# Patient Record
Sex: FEMALE | Race: WHITE | NOT HISPANIC OR LATINO | Employment: PART TIME | URBAN - METROPOLITAN AREA
[De-identification: names, ages, dates, MRNs, and addresses within clinical notes are randomized per-mention and may not be internally consistent; named-entity substitution may affect disease eponyms.]

---

## 2022-09-22 RX ORDER — DEXTROAMPHETAMINE SACCHARATE, AMPHETAMINE ASPARTATE, DEXTROAMPHETAMINE SULFATE AND AMPHETAMINE SULFATE 5; 5; 5; 5 MG/1; MG/1; MG/1; MG/1
20 TABLET ORAL
COMMUNITY
End: 2022-09-23 | Stop reason: SDUPTHER

## 2022-09-23 DIAGNOSIS — F90.2 ATTENTION DEFICIT HYPERACTIVITY DISORDER, COMBINED TYPE: Primary | ICD-10-CM

## 2022-09-23 DIAGNOSIS — F43.10 POSTTRAUMATIC STRESS DISORDER: ICD-10-CM

## 2022-09-23 DIAGNOSIS — F43.22 ADJUSTMENT DISORDER WITH ANXIETY: ICD-10-CM

## 2022-09-23 RX ORDER — DEXTROAMPHETAMINE SACCHARATE, AMPHETAMINE ASPARTATE, DEXTROAMPHETAMINE SULFATE AND AMPHETAMINE SULFATE 5; 5; 5; 5 MG/1; MG/1; MG/1; MG/1
TABLET ORAL
Qty: 60 TABLET | Refills: 0 | Status: SHIPPED | OUTPATIENT
Start: 2022-09-23 | End: 2022-10-10

## 2022-09-23 RX ORDER — DEXTROAMPHETAMINE SACCHARATE, AMPHETAMINE ASPARTATE, DEXTROAMPHETAMINE SULFATE AND AMPHETAMINE SULFATE 2.5; 2.5; 2.5; 2.5 MG/1; MG/1; MG/1; MG/1
TABLET ORAL
Qty: 60 TABLET | Refills: 0 | Status: SHIPPED | OUTPATIENT
Start: 2022-09-23 | End: 2022-10-10

## 2022-12-06 DIAGNOSIS — F90.2 ATTENTION DEFICIT HYPERACTIVITY DISORDER, COMBINED TYPE: ICD-10-CM

## 2022-12-06 RX ORDER — DEXTROAMPHETAMINE SACCHARATE, AMPHETAMINE ASPARTATE, DEXTROAMPHETAMINE SULFATE AND AMPHETAMINE SULFATE 7.5; 7.5; 7.5; 7.5 MG/1; MG/1; MG/1; MG/1
TABLET ORAL
Qty: 60 TABLET | Refills: 0 | Status: CANCELLED | OUTPATIENT
Start: 2022-12-06

## 2022-12-06 NOTE — TELEPHONE ENCOUNTER
Medication Refill Request     Name of Medication Adderall   Dose/Frequency 30mg 1 xdaily   Quantity 30  Verified pharmacy   [x]  Verified ordering Provider   [x]  Does patient have enough for the next 3 days? Yes [] No [x]  Does patient have a follow-up appointment scheduled?  Yes [x] No []   If so when is appointment: 01/19/23

## 2022-12-07 ENCOUNTER — TELEPHONE (OUTPATIENT)
Dept: PSYCHIATRY | Facility: CLINIC | Age: 47
End: 2022-12-07

## 2022-12-07 DIAGNOSIS — F90.2 ATTENTION DEFICIT HYPERACTIVITY DISORDER, COMBINED TYPE: Primary | ICD-10-CM

## 2022-12-07 RX ORDER — DEXTROAMPHETAMINE SACCHARATE, AMPHETAMINE ASPARTATE, DEXTROAMPHETAMINE SULFATE AND AMPHETAMINE SULFATE 3.75; 3.75; 3.75; 3.75 MG/1; MG/1; MG/1; MG/1
TABLET ORAL
Qty: 120 TABLET | Refills: 0 | Status: SHIPPED | OUTPATIENT
Start: 2022-12-07 | End: 2023-04-05

## 2022-12-07 NOTE — TELEPHONE ENCOUNTER
Adderall XR 15mg PA request received and processed  Waiting for INS determination  12/8-Approval through 01/10/2023  Spoke to and made aware  Scanned to chart

## 2022-12-08 ENCOUNTER — DOCUMENTATION (OUTPATIENT)
Dept: PSYCHIATRY | Facility: CLINIC | Age: 47
End: 2022-12-08

## 2022-12-19 DIAGNOSIS — F90.2 ATTENTION DEFICIT HYPERACTIVITY DISORDER, COMBINED TYPE: ICD-10-CM

## 2022-12-19 RX ORDER — DEXTROAMPHETAMINE SACCHARATE, AMPHETAMINE ASPARTATE, DEXTROAMPHETAMINE SULFATE AND AMPHETAMINE SULFATE 7.5; 7.5; 7.5; 7.5 MG/1; MG/1; MG/1; MG/1
TABLET ORAL
Qty: 60 TABLET | Refills: 0 | Status: SHIPPED | OUTPATIENT
Start: 2022-12-19

## 2022-12-19 NOTE — TELEPHONE ENCOUNTER
Medication Refill Request     Name of Medication Adderall   Dose/Frequency 30 Mg 1 po bid   Quantity 30  Verified pharmacy   [x]  Verified ordering Provider   [x]  Does patient have enough for the next 3 days? Yes [x] No []  Does patient have a follow-up appointment scheduled? Yes [] No [x]   If so when is appointment: 01/19/23    OR     Medication Refill Request     Name of Medication Adderall   Dose/Frequency 15 Mg 2po bid   Quantity 60  Verified pharmacy   [x]  Verified ordering Provider   [x]  Does patient have enough for the next 3 days? Yes [] No [x]  Does patient have a follow-up appointment scheduled?  Yes [] No []   If so when is appointment: 01/19/23

## 2022-12-20 ENCOUNTER — TELEPHONE (OUTPATIENT)
Dept: PSYCHIATRY | Facility: CLINIC | Age: 47
End: 2022-12-20

## 2022-12-20 ENCOUNTER — DOCUMENTATION (OUTPATIENT)
Dept: PSYCHIATRY | Facility: CLINIC | Age: 47
End: 2022-12-20

## 2022-12-20 NOTE — TELEPHONE ENCOUNTER
Adderall 30mg PA request rec;d and processed  Waiting for INS determination  12/20/2022- Approval received through 12/20/2023  Left message to make aware  Will scan to chart

## 2022-12-22 DIAGNOSIS — F90.2 ATTENTION DEFICIT HYPERACTIVITY DISORDER, COMBINED TYPE: ICD-10-CM

## 2022-12-22 RX ORDER — DEXTROAMPHETAMINE SACCHARATE, AMPHETAMINE ASPARTATE, DEXTROAMPHETAMINE SULFATE AND AMPHETAMINE SULFATE 7.5; 7.5; 7.5; 7.5 MG/1; MG/1; MG/1; MG/1
TABLET ORAL
Qty: 60 TABLET | Refills: 0 | Status: SHIPPED | OUTPATIENT
Start: 2022-12-22

## 2022-12-22 NOTE — TELEPHONE ENCOUNTER
Patient walked in and explained that [facility] had dispensed Adderral on 12/8 but only for a half month supply (16 days)  Patient requests an Rx Refill for the regular amount originally prescribed, but send order to [address] [place] [address]  Patient states that she will walk over to [facility] and provide insurance information in preparation  Patient requests call back when complete  Medication Refill Request     Name of Medication Adderall  Dose/Frequency 30mg 2x a day  Quantity 60 tabs  Verified pharmacy   [x]  Verified ordering Provider   [x]  Does patient have enough for the next 3 days? Yes [] No [x]  Does patient have a follow-up appointment scheduled?  Yes [] No []   If so when is appointment: 1/19/23 @ 11:30am

## 2022-12-28 ENCOUNTER — TELEPHONE (OUTPATIENT)
Dept: PSYCHIATRY | Facility: CLINIC | Age: 47
End: 2022-12-28

## 2022-12-28 NOTE — TELEPHONE ENCOUNTER
Called client to inform her that med refill she requested was sent to [facility]  Pharmacy number provided to client

## 2023-01-19 ENCOUNTER — TELEMEDICINE (OUTPATIENT)
Dept: PSYCHIATRY | Facility: CLINIC | Age: 48
End: 2023-01-19

## 2023-01-19 DIAGNOSIS — F90.2 ATTENTION DEFICIT HYPERACTIVITY DISORDER, COMBINED TYPE: ICD-10-CM

## 2023-01-19 RX ORDER — DEXTROAMPHETAMINE SACCHARATE, AMPHETAMINE ASPARTATE, DEXTROAMPHETAMINE SULFATE AND AMPHETAMINE SULFATE 7.5; 7.5; 7.5; 7.5 MG/1; MG/1; MG/1; MG/1
TABLET ORAL
Qty: 60 TABLET | Refills: 0 | Status: SHIPPED | OUTPATIENT
Start: 2023-01-19

## 2023-01-19 NOTE — PSYCH
Virtual Regular Visit    Verification of patient location:in car    Patient is located in the following state in which I hold an active license [place]      Assessment/Plan:       Diagnoses and all orders for this visit:    Attention deficit hyperactivity disorder, combined type  -     amphetamine-dextroamphetamine (ADDERALL) 30 MG tablet; Take 1 PO BID  -     amphetamine-dextroamphetamine (ADDERALL, 30MG,) 30 MG tablet; Take 1 PO BID  -     amphetamine-dextroamphetamine (ADDERALL, 30MG,) 30 MG tablet; Take 1 PO BID          Goals addressed in session:   Good Health  Counseling provided:      Treatment Recommendations- Risks Benefits       Immediate Medical/Psychiatric/Psychotherapy Treatments and Any Precautions:     Risks, Benefits And Possible Side Effects Of Medications:  Risks, benefits, and possible side effects of medications explained to patient and patient verbalizes understanding    Controlled Medication Discussion: The patient has been filling controlled prescriptions on time as prescribed to [facility]      Reason for visit is No chief complaint on file  Medication Management     Encounter provider RYAN Meyer    Provider [place] [address] [place] [address]  [phone]      Recent Visits  No visits were found meeting these conditions  Showing recent visits within past 7 days and meeting all other requirements  Today's Visits  Date Type Provider Dept   01/19/23 Telemedicine [facility] today's visits and meeting all other requirements  Future Appointments  No visits were found meeting these conditions  Showing future appointments within next 150 days and meeting all other requirements       The patient was identified by name and date of birth   Mikeluis Zaratelindsay was informed that this is a telemedicine visit and [facility] the [facility]  She agrees to proceed     My office door was closed  No one else was in the room  She acknowledged consent and understanding of privacy and security of the video platform  The patient has agreed to participate and understands they can discontinue the visit at any time  Patient is aware this is a billable service  Subjective    Jessica Quintanilla is a 52 y o  female    here today for a med check  This was via [facility]  normal appetite      HPI  Mood OK  "I am going through the changes "  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  Focus good   No problems with medication  Appetite Sleep good  Health OK  Denies SI/HI     No past medical history on file  No past surgical history on file  Current Outpatient Medications   Medication Sig Dispense Refill   • amphetamine-dextroamphetamine (ADDERALL) 30 MG tablet Take 1 PO BID 60 tablet 0   • amphetamine-dextroamphetamine (ADDERALL, 30MG,) 30 MG tablet Take 1 PO BID 60 tablet 0   • amphetamine-dextroamphetamine (ADDERALL, 30MG,) 30 MG tablet Take 1 PO BID 60 tablet 0   • amphetamine-dextroamphetamine (ADDERALL, 15MG,) 15 MG tablet Take 2 PO  tablet 0     No current facility-administered medications for this visit  Not on File    Social History     Substance and Sexual Activity   Drug Use Not on file       No family history on file          Objective    Mental status:  Appearance calm and cooperative , adequate hygiene and grooming and good eye contact    Mood mood appropriate   Affect affect appropriate    Speech a normal rate and fluent   Thought Processes normal thought processes   Hallucinations no hallucinations present    Thought Content no delusions   Abnormal Thoughts no suicidal thoughts  and no homicidal thoughts    Orientation  oriented to person and place and time   Remote Memory short term memory intact and long term memory intact   Attention Span concentration intact   Intellect Appears to be of Average Intelligence   Insight Insight intact   Judgement judgment was intact   Muscle Strength Muscle strength and tone were normal   Language no difficulty naming common objects   Fund of Knowledge displays adequate knowledge of current events   Pain none   Pain Scale 0       Video Exam    There were no vitals filed for this visit      I spent 15 minutes directly with the patient during this visit    Patient Instructions   Continue Current Tx  Session lasted 5 minutes after she left to document, Tx Plan and meds  Report Problems  Return 3 months       Visit Time    Visit Start Time: 0886  Visit Stop Time: 3349  Total Visit Duration: 21 min

## 2023-01-19 NOTE — BH TREATMENT PLAN
TREATMENT PLAN (Medication Management Only)        [facility]    Name and Date of Birth:  Remigio Bailey 52 y o  1975  Date of Treatment Plan: January 19, 2023  Diagnosis/Diagnoses:    1  Attention deficit hyperactivity disorder, combined type      Strengths/Personal Resources for Self-Care: supportive family, supportive friends, taking medications as prescribed, ability to adapt to life changes, ability to communicate needs, ability to communicate well, ability to listen, ability to reason, ability to understand psychiatric illness  Area/Areas of need (in own words): ADHD symptoms  1  Long Term Goal: maintain ADHD symptoms  Target Date:6 months - 7/19/2023  Person/Persons responsible for completion of goal: Anais Schneider  2  Short Term Objective (s) - How will we reach this goal?:   A  Provider new recommended medication/dosage changes and/or continue medication(s): continue current medications as prescribed Adderall   B  N/A   C  N/A  Target Date:6 months - 7/19/2023  Person/Persons Responsible for Completion of Goal: Anais Schneider  Progress Towards Goals: stable  Treatment Modality: medication management every 3 months  Review due 180 days from date of this plan: 6 months - 7/19/2023  Expected length of service: maintenance  My Physician/PA/NP and I have developed this plan together and I agree to work on the goals and objectives  I understand the treatment goals that were developed for my treatment

## 2023-01-19 NOTE — PATIENT INSTRUCTIONS
Continue Current Tx  Session lasted 5 minutes after she left to document, Tx Plan and meds  Report Problems  Return 3 months

## 2023-04-05 ENCOUNTER — TELEMEDICINE (OUTPATIENT)
Dept: PSYCHIATRY | Facility: CLINIC | Age: 48
End: 2023-04-05

## 2023-04-05 DIAGNOSIS — F90.2 ATTENTION DEFICIT HYPERACTIVITY DISORDER, COMBINED TYPE: Primary | ICD-10-CM

## 2023-04-05 RX ORDER — DEXTROAMPHETAMINE SACCHARATE, AMPHETAMINE ASPARTATE, DEXTROAMPHETAMINE SULFATE AND AMPHETAMINE SULFATE 7.5; 7.5; 7.5; 7.5 MG/1; MG/1; MG/1; MG/1
TABLET ORAL
Qty: 60 TABLET | Refills: 0 | Status: SHIPPED | OUTPATIENT
Start: 2023-04-05

## 2023-04-05 NOTE — PSYCH
Virtual Regular Visit    Verification of patient location:at home    Patient is located in the following state in which I hold an active license [place]      Assessment/Plan:       Diagnoses and all orders for this visit:    Attention deficit hyperactivity disorder, combined type  -     amphetamine-dextroamphetamine (ADDERALL, 30MG,) 30 MG tablet; Take 1 PO BID  -     amphetamine-dextroamphetamine (ADDERALL, 30MG,) 30 MG tablet; Take 1 PO BID  -     amphetamine-dextroamphetamine (ADDERALL) 30 MG tablet; Take 1 PO BID          Goals addressed in session:   Good Health  Stresssss Management   Counseling provided:      Treatment Recommendations- Risks Benefits       Immediate Medical/Psychiatric/Psychotherapy Treatments and Any Precautions:     Risks, Benefits And Possible Side Effects Of Medications:  Risks, benefits, and possible side effects of medications explained to patient and patient verbalizes understanding    Controlled Medication Discussion: No records found for controlled prescriptions according to [facility]      Reason for visit is No chief complaint on file  Medication management     Encounter provider RYAN Ramires    Provider [place] [address] [place] [address]  [phone]      Recent Visits  No visits were found meeting these conditions  Showing recent visits within past 7 days and meeting all other requirements  Today's Visits  Date Type Provider Dept   04/05/23 Telemedicine [facility] today's visits and meeting all other requirements  Future Appointments  No visits were found meeting these conditions  Showing future appointments within next 150 days and meeting all other requirements       The patient was identified by name and date of birth   Bethanie Gomez was informed that this is a telemedicine visit and that the "visit is being conducted throughthe [facility] platform  She agrees to proceed     My office door was closed  No one else was in the room  She acknowledged consent and understanding of privacy and security of the video platform  The patient has agreed to participate and understands they can discontinue the visit at any time  Patient is aware this is a billable service  Sang Peng is a 52 y o  female    Here today for a med check  This was via [address] [facility]    normal appetite      HPI  Mood \"OK\"  Going through \"menopause\"  Focus \"fine\"  \"The medication slows things down for me\"  No problems with medication   Appetite Sleep good  Health OK  Denies SI/HI    No past medical history on file  No past surgical history on file  Current Outpatient Medications   Medication Sig Dispense Refill   • amphetamine-dextroamphetamine (ADDERALL) 30 MG tablet Take 1 PO BID 60 tablet 0   • amphetamine-dextroamphetamine (ADDERALL, 30MG,) 30 MG tablet Take 1 PO BID 60 tablet 0   • amphetamine-dextroamphetamine (ADDERALL, 30MG,) 30 MG tablet Take 1 PO BID 60 tablet 0     No current facility-administered medications for this visit  Not on File    Social History     Substance and Sexual Activity   Drug Use Not on file       No family history on file          Objective    Mental status:  Appearance calm and cooperative , adequate hygiene and grooming and good eye contact    Mood mood appropriate   Affect affect appropriate    Speech a normal rate and fluent   Thought Processes normal thought processes   Hallucinations no hallucinations present    Thought Content no delusions   Abnormal Thoughts no suicidal thoughts  and no homicidal thoughts    Orientation  oriented to person and place and time   Remote Memory short term memory intact and long term memory intact   Attention Span concentration intact   Intellect Appears to be of Average Intelligence   Insight Insight intact   Judgement judgment was intact " Muscle Strength Muscle strength and tone were normal and Normal gait    Language no difficulty naming common objects   Fund of Knowledge displays adequate knowledge of current events   Pain none   Pain Scale 0       Video Exam    There were no vitals filed for this visit      I spent 15 minutes directly with the patient during this visit    Patient Instructions   Continue Current Tx  Report Problems  Return 3 months       Visit Time    Visit Start Time: 9860  Visit Stop Time: 833836 min  Total Visit Duration:

## 2023-04-05 NOTE — BH TREATMENT PLAN
TREATMENT PLAN (Medication Management Only)        [facility]    Name and Date of Birth:  Jason Mills 52 y o  1975  Date of Treatment Plan: April 5, 2023  Diagnosis/Diagnoses:    1  Attention deficit hyperactivity disorder, combined type      Strengths/Personal Resources for Self-Care: supportive family, supportive friends, taking medications as prescribed  Area/Areas of need (in own words): ADHD symptoms  1  Long Term Goal: maintain control of ADHD symptoms  Target Date:6 months - 10/5/2023  Person/Persons responsible for completion of goal: Ayanna Frankel  2  Short Term Objective (s) - How will we reach this goal?:   A  Provider new recommended medication/dosage changes and/or continue medication(s): continue current medications as prescribed Adderall   B  N/A   C  N/A  Target Date:6 months - 10/5/2023  Person/Persons Responsible for Completion of Goal: Ayanna Frankel  Progress Towards Goals: stable  Treatment Modality: medication management every 3 months  Review due 180 days from date of this plan: 6 months - 10/5/2023  Expected length of service: maintenance  My Physician/PA/NP and I have developed this plan together and I agree to work on the goals and objectives  I understand the treatment goals that were developed for my treatment

## 2023-06-13 ENCOUNTER — DOCUMENTATION (OUTPATIENT)
Dept: PSYCHIATRY | Facility: CLINIC | Age: 48
End: 2023-06-13

## 2023-06-13 NOTE — PSYCH
[facility]    Patient Name oCy Patel     Date of Birth: 52 y o  1975      MRN: [id number]    Admission Date: several years ago    Date of Transfer: June 13, 2023    Admission Diagnosis:     Adjustment Disorder with anxiety  PTSD  ADHD, Combined type    Current Diagnosis:     No diagnosis found  Reason for Admission: BODØ presented for treatment due to depression, ADHD and PTSD symptoms  Primary complaints included ANXIETY SYMPTOMS: unremarkable and ADHD SYMPTOMS: unremarkable  Progress in Treatment: BODØ was seen for Medication Management  During the course of treatment she      Episodes of Higher Level of Care: No    Transfer request Initiated by: Psychiatrist: Nurse Practitioner Maame Whitney Therapist: None    Reason for Transfer Request: clinician leaving practice    Does this individual need a clinician with specialized training/expertise?: No    Is this client working with any other [facility] Providers/Therapists? Psychiatrist: None Therapist: None    Other pertinent issues: None    Are there any specific individuals who would be a “best fit” or who have already agreed to accept this transfer request?      Psychiatrist: None   Therapist: None  Rationale: Not Applicable    Attempts to maintain the current therapeutic relationship: Not Applicable    Transfer request routed to Clinical Coordinator for input and/or approval      Comments from other involved providers and/or clinical coordinator: None    ELIZABETH Browning 46    Patient Name Coy Patel     Date of Birth: 52 y o  1975      MRN: [id number]    Admission Date: several years ago    Date of Transfer: June 13, 2023    Admission Diagnosis:     Adjustment Disorder with anxiety  PTSD  ADHD, Combined type    Current Diagnosis:     No diagnosis found      Reason for Admission: Payton Busby presented for treatment due to ADHD, anxiety and PTSD symptoms  Primary complaints included DEPRESSIVE SYMPTOMS: unremarkable - euthymic mood  Progress in Treatment: Payton Busby was seen for Medication Management  During the course of treatment she      Episodes of Higher Level of Care: No    Transfer request Initiated by: Psychiatrist: Nurse Practitioner Alize Champagne Therapist: None    Reason for Transfer Request: clinician leaving practice    Does this individual need a clinician with specialized training/expertise?: No    Is this client working with any other [facility] Providers/Therapists?  Psychiatrist: None Therapist: None    Other pertinent issues: None    Are there any specific individuals who would be a “best fit” or who have already agreed to accept this transfer request?      Psychiatrist: None   Therapist: None  Rationale: Not Applicable    Attempts to maintain the current therapeutic relationship: Not Applicable    Transfer request routed to Clinical Coordinator for input and/or approval      Comments from other involved providers and/or clinical coordinator: None    BASHIR BairesNP06/13/23

## 2023-06-16 ENCOUNTER — TELEPHONE (OUTPATIENT)
Dept: PSYCHIATRY | Facility: CLINIC | Age: 48
End: 2023-06-16

## 2023-06-16 NOTE — TELEPHONE ENCOUNTER
Contacted client about cancelling 7/5/2023 appt  with RYAN Summers due to him retiring  We are in the process of hiring new providers within our practice  Once we have their schedules, we will contact you to reschedule your appt  Please call [phone] to request refills as needed

## 2023-07-24 DIAGNOSIS — F90.2 ATTENTION DEFICIT HYPERACTIVITY DISORDER, COMBINED TYPE: ICD-10-CM

## 2023-07-24 RX ORDER — DEXTROAMPHETAMINE SACCHARATE, AMPHETAMINE ASPARTATE, DEXTROAMPHETAMINE SULFATE AND AMPHETAMINE SULFATE 7.5; 7.5; 7.5; 7.5 MG/1; MG/1; MG/1; MG/1
TABLET ORAL
Qty: 60 TABLET | Refills: 0 | Status: SHIPPED | OUTPATIENT
Start: 2023-07-24

## 2023-07-24 NOTE — TELEPHONE ENCOUNTER
Walked in and informed about the process with Garcia's transition plan. Will need a refill on her medication though, confirmed pharmacy and medication that is needed. Information given to prescription department and informed about wait time.

## 2023-09-05 ENCOUNTER — TELEPHONE (OUTPATIENT)
Dept: PSYCHIATRY | Facility: CLINIC | Age: 48
End: 2023-09-05

## 2023-09-05 DIAGNOSIS — F90.2 ATTENTION DEFICIT HYPERACTIVITY DISORDER, COMBINED TYPE: ICD-10-CM

## 2023-09-05 RX ORDER — DEXTROAMPHETAMINE SACCHARATE, AMPHETAMINE ASPARTATE, DEXTROAMPHETAMINE SULFATE AND AMPHETAMINE SULFATE 7.5; 7.5; 7.5; 7.5 MG/1; MG/1; MG/1; MG/1
TABLET ORAL
Qty: 60 TABLET | Refills: 0 | Status: SHIPPED | OUTPATIENT
Start: 2023-09-05

## 2023-09-06 ENCOUNTER — TELEPHONE (OUTPATIENT)
Dept: PSYCHIATRY | Facility: CLINIC | Age: 48
End: 2023-09-06

## 2023-09-19 ENCOUNTER — TELEPHONE (OUTPATIENT)
Dept: PSYCHIATRY | Facility: CLINIC | Age: 48
End: 2023-09-19

## 2023-09-19 NOTE — TELEPHONE ENCOUNTER
Outreach call placed about MA not showing active for tomorrow appointments. Requested a call to see if client would like to reschedule the appointment or pay the self pay rate for the appointment.   Call back [phone]

## 2023-10-03 ENCOUNTER — OFFICE VISIT (OUTPATIENT)
Dept: PSYCHIATRY | Facility: CLINIC | Age: 48
End: 2023-10-03
Payer: COMMERCIAL

## 2023-10-03 DIAGNOSIS — F90.2 ATTENTION DEFICIT HYPERACTIVITY DISORDER, COMBINED TYPE: ICD-10-CM

## 2023-10-03 DIAGNOSIS — F41.1 GAD (GENERALIZED ANXIETY DISORDER): Primary | ICD-10-CM

## 2023-10-03 PROCEDURE — 90792 PSYCH DIAG EVAL W/MED SRVCS: CPT | Performed by: STUDENT IN AN ORGANIZED HEALTH CARE EDUCATION/TRAINING PROGRAM

## 2023-10-03 RX ORDER — DEXTROAMPHETAMINE SACCHARATE, AMPHETAMINE ASPARTATE, DEXTROAMPHETAMINE SULFATE AND AMPHETAMINE SULFATE 7.5; 7.5; 7.5; 7.5 MG/1; MG/1; MG/1; MG/1
TABLET ORAL
Qty: 60 TABLET | Refills: 0 | Status: SHIPPED | OUTPATIENT
Start: 2023-10-03

## 2023-10-03 NOTE — PSYCH
[address] [address]    Name and Date of Birth:  Tania Yang 52 y.o. 1975 MRN: [id number]    Date of Visit: October 3, 2023    Reason for visit: Full psychiatric intake assessment for medication management     HPI     Tania Yang is a 52 y.o. female with a past psychiatric history significant for ADHD, depression and anxiety who presents to the [address] [place] [address] outpatient clinic for intake assessment. Fay Vivas presents as a new patient for this physician after being transferred from her previous provider Angélica Scales who had recently retired. Today, patient denied SI, HI, AVH, milo, delusions but did endorse some feelings of depression and anxiety as quantified by the screening test which indicated moderate depression and moderate to severe anxiety. Patient stated that her mood symptoms began as a child when she had to constantly change schools at least 9 times after her parents were . She stated that she had to "grow up quickly" to deal with the tumultuous [place] of her childhood. She ended up struggling throughout class due to symptoms of ADHD which included inattention and hyperactivity. She stated that she hated school and almost did not graduate high school since she stopped at 11th grade and then later enrolled in a program to finally complete high school and earn a GED. She did have a few instances of suicidal ideation in the form of death wishes most recently about 3 years ago when she was in the process of getting  from her previous partner. She also states that currently she is unemployed and is constantly trying to find a new job but feels almost as if life has been against her because none of them have seemed to pan out for her.   She states that she has 3 children, 2 of whom have moved out and have successful personal lives but she also has a 5year-old daughter at home whom she cares for. She states that her commitment to her family and her daughter is what keeps her going and states that she feels as if she has large emotional reserves to have been relatively stable throughout the tumultuous [place] of her life. With regards to her history she denies HI, AVH, delusions, milo. She states that her Adderall helps her inattention and hyperactivity however it paradoxically sometimes makes her sleepy. Regardless, she wishes to continue on the Adderall 30 mg twice daily. She states that she had recently within the last year or 2 started to suffer from post menopausal symptoms. She had broached the topic of treating the postmenopausal symptoms with her OB. In addition to this, she had previously started the conversation with her previous provider Sherwin Youssef to potentially initiate another medication to help her with her depression and anxiety. She states that her sleep has been tumultuous because her child tends to come into her bedroom and disrupt her sleep. Her appetite has been fair. Her goals of treatment include continuing ADHD medications and exploring new treatments for her depression and anxiety. Time was also taken to discuss a crisis plan including calling [phone] or heading to the nearest ED should she have any mental health decompensation or crisis whatsoever, to which she was amenable. She also voiced understanding and agreement to call 911 or go to the nearest ED should she have any physical emergency or decompensation whatsoever. Other than mental health difficulties, she did state that she has intermittent migraines for which she is being treated by her PCP.     Current Rating Scores:     Current PHQ-9   PHQ-2/9 Depression Screening    Little interest or pleasure in doing things: 0 - not at all  Feeling down, depressed, or hopeless: 3 - nearly every day  Trouble falling or staying asleep, or sleeping too much: 0 - not at all  Feeling tired or having little energy: 3 - nearly every day  Poor appetite or overeatin - more than half the days  Feeling bad about yourself - or that you are a failure or have let yourself or your family down: 1 - several days  Trouble concentrating on things, such as reading the newspaper or watching television: 2 - more than half the days  Moving or speaking so slowly that other people could have noticed. Or the opposite - being so fidgety or restless that you have been moving around a lot more than usual: 0 - not at all  Thoughts that you would be better off dead, or of hurting yourself in some way: 0 - not at all  PHQ-9 Score: 11   PHQ-9 Interpretation: Moderate depression        Current JERRI-7 is   JERRI-7 Flowsheet Screening    Flowsheet Row Most Recent Value   Over the last 2 weeks, how often have you been bothered by any of the following problems? Feeling nervous, anxious, or on edge 3   Not being able to stop or control worrying 3   Worrying too much about different things 3   Trouble relaxing 3   Being so restless that it is hard to sit still 2   Becoming easily annoyed or irritable 3   Feeling afraid as if something awful might happen 2   JERRI-7 Total Score 19      . Psychiatric Review Of Systems:    Sleep changes: decreased  Appetite changes: no  Weight changes: no  Energy/anergy: yes  Interest/pleasure/anhedonia: decreased  Somatic symptoms: no  Anxiety/panic: yes  Pinky: no  Guilty/hopeless: yes  Self injurious behavior/risky behavior: no  Suicidal ideation: no  Homicidal ideation: no  Auditory hallucinations: no  Visual hallucinations: no  Other hallucinations: no  Delusional thinking: no  Eating disorder history: no, unknown  Obsessive/compulsive symptoms: unknown    Review Of Systems:   At this time:  Constitutional negative   ENT negative   Cardiovascular negative   Respiratory cough   Gastrointestinal negative   Genitourinary negative   Musculoskeletal negative   Integumentary negative   Neurological headache   Endocrine negative   Other Symptoms none, all other systems are negative       Family Psychiatric History:     No family history on file. Maternal grandfather may have committed suicide. Past Psychiatric History:     Inpatient psychiatric admissions: Denies  Prior outpatient psychiatric linkage: [facility]  Past/current psychotherapy: None reported  History of suicidal attempts/gestures: At least 3 incidences of death wishes but denies harming self  History of violence/aggressive behaviors: None reported  Psychotropic medication trials: Wellbutrin, Effexor. Neither helped the patient for depression or anxiety  Substance abuse inpatient/outpatient rehabilitation: None reported    Substance Abuse History:     No past legal actions or arrests secondary to substance intoxication. The patient denies prior DWIs/DUIs. No history of outpatient/inpatient rehabilitation programs. Leida Pelletier does not exhibit objective evidence of substance withdrawal during today's examination nor does Leida Pelletier appear under the influence of any psychoactive substance. Denies any drugs or alcohol other than some cigarettes each day. Social History:    Developmental: Denies a history of milestone/developmental delay. Denies a history of in-utero exposure to toxins/illicit substances. Needed some special education due to likely ADHD growing up  Education: high school diploma/GED  Marital history:   Living arrangement, social support: children, has 2 other grown children. Lives with 5year-old. States that she is in danger of losing her current residence due to financial difficulties  Occupational History: unemployed  Access to firearms: Denies direct access to weapons/firearms. Hector Lantigua has no history of arrests or violence with a deadly weapon. Traumatic History:     Abuse:verbal  Other Traumatic Events: None reported    Past Medical History:    No past medical history on file. No past surgical history on file.   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 She states that this sometimes causes her some feelings of dysphagia. She denies any worsening of this and voices understanding and agreement to call 911 or go to the ED should this worsen. She also voices understanding and agreement to closely follow up with a PCP or specialist regarding these issues. History Review: The following portions of the patient's history were reviewed and updated as appropriate: allergies, current medications, past family history, past medical history, past social history, past surgical history and problem list.    OBJECTIVE:    Vital signs in last 24 hours: There were no vitals filed for this visit.     Mental Status Evaluation:    Appearance age appropriate, casually dressed, looks stated age, overweight   Behavior cooperative, mildly anxious   Speech normal rate, normal volume, normal pitch   Mood dysphoric   Affect constricted   Thought Processes organized, goal directed   Associations intact associations   Thought Content no overt delusions   Perceptual Disturbances: no auditory hallucinations, no visual hallucinations   Abnormal Thoughts  Risk Potential Suicidal ideation - None  Homicidal ideation - None  Potential for aggression - No   Orientation oriented to person, place, time/date and situation   Memory recent and remote memory grossly intact   Consciousness alert and awake   Attention Span Concentration Span attention span and concentration are age appropriate   Intellect appears to be of average intelligence   Insight intact   Judgement intact   Muscle Strength and  Gait normal muscle strength and normal muscle tone, normal gait and normal balance   Motor Activity no abnormal movements   Language no difficulty naming common objects, no difficulty repeating a phrase   Fund of Knowledge adequate knowledge of current events  adequate fund of knowledge regarding past history  adequate fund of knowledge regarding vocabulary    Pain none   Pain Scale 0       Laboratory Results: I have personally reviewed all pertinent laboratory/tests results    Recent Labs (last 2 months):   No visits with results within 2 Month(s) from this visit. Latest known visit with results is:   No results found for any previous visit. Suicide/Homicide Risk Assessment:    Risk of Harm to Self:  • The following ratings are based on assessment at the time of the interview  • Historical Risk Factors include: History of suicidal ideation  • Protective Factors: no current suicidal ideation, compliant with mental health treatment, connection to own children, having a desire to be alive    Risk of Harm to Others:  • The following ratings are based on assessment at the time of the interview  • Historical Risk Factors include: none. • Protective Factors: no current homicidal ideation    The following interventions are recommended: no intervention changes needed. Although patient's acute lethality risk is LOW, long-term/chronic lethality risk is mildly elevated given __. However, at the current moment, Perry Perez is future-oriented, forward-thinking, and demonstrates ability to act in a self-preserving manner as evidenced by volitionally presenting to the clinic today, seeking treatment. Additionally, Perry Perez __ suggesting a will and desire to live. At this juncture, inpatient hospitalization is not currently warranted. To mitigate future risk, patient should adhere to treatment recommendations, avoid alcohol/illicit substance use, utilize community-based resources and familiar support, and prioritize mental health treatment. DSM-V Diagnoses:     1.)  JERRI  2.)  MDD, moderate, recurrent  3.)  ADHD combined type    Assessment/Plan:     Plan is to continue 30 mg Adderall twice daily.   Patient was counseled to take her second dose of Adderall earlier in the day to help her sleep better at night. Will meet again at next appointment to discuss possible SSRI initiation to help with feelings of depression and anxiety. Today's Plan/Medical Decision Making:    Psychopharmacologically, I spoke at length with Donn Saunders about the bio-psycho-social approach to treatment and avenues for intervention. I stressed the importance of making better dietary choices, expanding exercise regimen, and reestablishing a sense of purpose and connectivity in life. Treatment Recommendations/Precautions:        Medication management every 1 months  Aware of 24 hour and weekend coverage for urgent situations accessed by calling [facility] main practice number    Patient voiced understanding and agreement to call 911 or head to the nearest emergency room should they experience any physical or mental health decompensation whatsoever. Medications Risks/Benefits:      Risks, Benefits And Possible Side Effects Of Medications:    Risks, benefits, and possible side effects of medications explained to Donn Saunders and she verbalizes understanding and agreement for treatment. Controlled Medication Discussion:     Donn Saunders has been filling controlled prescriptions on time as prescribed according to [place] Prescription Drug Monitoring Program    Treatment Plan:    Completed and signed during the session: We will perform in next encounter due to lack of time today      Visit Time    Visit Start Time: 9:10 AM  Visit Stop Time: 9:50 AM  Total Visit Duration: 40 minutes     The total visit duration detailed above includes: patient engagement, medication management, psychotherapy/counseling, discussion regarding treatment goals, documentation, review of past medical records, and coordination of care. Note Share Disclaimer:      This note was not shared with the patient due to reasonable likelihood of causing patient harm    Steven Baum DO  10/03/23

## 2023-10-31 ENCOUNTER — OFFICE VISIT (OUTPATIENT)
Dept: PSYCHIATRY | Facility: CLINIC | Age: 48
End: 2023-10-31
Payer: COMMERCIAL

## 2023-10-31 DIAGNOSIS — F22 PARANOIA (HCC): Primary | ICD-10-CM

## 2023-10-31 PROCEDURE — 99214 OFFICE O/P EST MOD 30 MIN: CPT | Performed by: STUDENT IN AN ORGANIZED HEALTH CARE EDUCATION/TRAINING PROGRAM

## 2023-10-31 RX ORDER — ARIPIPRAZOLE 5 MG/1
5 TABLET ORAL DAILY
Qty: 8 TABLET | Refills: 0 | Status: SHIPPED | OUTPATIENT
Start: 2023-10-31 | End: 2023-11-05 | Stop reason: SDUPTHER

## 2023-10-31 NOTE — PSYCH
MEDICATION MANAGEMENT NOTE         [facility]      Name and Date of Birth:  Krzysztof Shannon 52 y.o. 1975 MRN: [id number]    Date of Visit: October 31, 2023    Reason for Visit: Follow-up visit for medication management       SUBJECTIVE:    Krzysztof Shannon is a 52 y.o. female with past psychiatric history significant for ADHD, PTSD, adjustment disorder with anxiety who was personally seen and evaluated today at the [address] [place] [address] outpatient clinic for follow-up and medication management. Sorin Ram denies SI, HI, AVH, milo today or since our last appointment but does endorse feelings of depression and anxiety along with paranoid delusions. Patient states that she is getting evicted within the next week or so and this has caused her significant stress and anxiety. She feels supported by her family such as her son who reportedly showed up for a "intervention" at her home. With regards to patient's paranoid thoughts, these had been voiced and an Beatriz manner in our past conversation when patient described herself as "cursed", meaning that patient had been feeling like no matter what she did, someone or something would interfere and derail her plans or her efforts. Today, patient again alluded to this by stating that there was what she called a "middleman" between her and her laptop, causing her laptop to constantly malfunction and subsequently caused her to lose jobs as a result. Another example of these paranoid thoughts patient had stated was that she feels as if people leaving garbage outside of her car to harass her. 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 Multiple other examples can be provided about this strain of paranoid delusion in both this conversation and her previous conversation however significantly, it seems to be affecting her ability to apply for  and support or seek employment and prevent herself from getting evicted. Patient states now that she has "a couple jobs" lined up however admits that she has lost multiple ones in the past and she is not hopeful or fully confident that these will be effective in keeping her off the street. Patient did not mention any details regarding the care or plan of care with her daughter until her daughter was specifically mentioned and patient could only state that it "worried her" that she had no plan whatsoever about maintaining her daughter's connection with school or any true plan of care, simply stating that she had "no one."  In summation, it appears as if patient has a diagnosis of paranoid personality disorder and this has the potential to impact her care of her child and herself. After discussion of risks, benefits, potential side effects, alternatives, patient was open to trialing Abilify 5 mg/day to help with some of her feelings of paranoia, with the painstaking clarification to not imply that patient was being universally paranoid but that this was meant to help take the edge off of some of her fears in her daily life. Subsequent plan would be starting an antidepressant which could help with patient's feelings of depression, anxiety, PTSD and also be augmented potentially with Abilify should patient tolerate the Abilify. Adderall regimen would remain the same and patient indicated that she is compliant with her Adderall and has adequate transportation to receive refills for her medication. She denied any acute specifically mental health complaints or concerns at this time. Current Rating Scores:     None completed today.     Review Of Systems:      Constitutional negative   ENT negative   Cardiovascular negative   Respiratory negative   Gastrointestinal negative   Genitourinary negative   Musculoskeletal negative   Integumentary negative   Neurological negative   Endocrine negative   Other Symptoms none, all other systems are negative       Past Psychiatric History: (unchanged information from previous note copied and italicized) - Information that is bolded has been updated. Inpatient psychiatric admissions: Denies  Prior outpatient psychiatric linkage: [facility]  Past/current psychotherapy: None reported  History of suicidal attempts/gestures: At least 3 incidences of death wishes but denies harming self  History of violence/aggressive behaviors: None reported  Psychotropic medication trials: Wellbutrin, Effexor. Neither helped the patient for depression or anxiety  Substance abuse inpatient/outpatient rehabilitation: None reported    Substance Abuse History: (unchanged information from previous note copied and italicized) - Information that is bolded has been updated. No past legal actions or arrests secondary to substance intoxication. The patient denies prior DWIs/DUIs. No history of outpatient/inpatient rehabilitation programs. [place] does not exhibit objective evidence of substance withdrawal during today's examination nor does [place] appear under the influence of any psychoactive substance. Denies any drugs or alcohol other than some cigarettes each day. Social History: (unchanged information from previous note copied and italicized) - Information that is bolded has been updated. Developmental: Denies a history of milestone/developmental delay. Denies a history of in-utero exposure to toxins/illicit substances. Needed some special education due to likely ADHD growing up  Education: high school diploma/GED  Marital history:   Living arrangement, social support: children, has 2 other grown children. Lives with 5year-old.   States that she is in danger of losing her current residence due to financial difficulties  Occupational History: unemployed  Access to firearms: Denies direct access to weapons/firearms. Suzie Payment has no history of arrests or violence with a deadly weapon. Traumatic History: (unchanged information from previous note copied and italicized) - Information that is bolded has been updated. Abuse:verbal  Other Traumatic Events: None reported    Past Medical History:    No past medical history on file. No past surgical history on file. No Known Allergies    Substance Abuse History:    Social History     Substance and Sexual Activity   Alcohol Use Not on file     Social History     Substance and Sexual Activity   Drug Use Not on file       Social History:    Social History     Socioeconomic History    Marital status: Legally      Spouse name: Not on file    Number of children: Not on file    Years of education: Not on file    Highest education level: Not on file   Occupational History    Not on file   Tobacco Use    Smoking status: Some Days     Types: Cigarettes    Smokeless tobacco: Never   Substance and Sexual Activity    Alcohol use: Not on file    Drug use: Not on file    Sexual activity: Not on file   Other Topics Concern    Not on file   Social History Narrative    Not on file     Social Determinants of Health     Financial Resource Strain: Not on file   Food Insecurity: Not on file   Transportation Needs: Not on file   Physical Activity: Not on file   Stress: Not on file   Social Connections: Not on file   Intimate Partner Violence: Not on file   Housing Stability: Not on file       Family Psychiatric History:     No family history on file. History Review:  The following portions of the patient's history were reviewed and updated as appropriate: allergies, current medications, past family history, past medical history, past social history, past surgical history, and problem list.         OBJECTIVE: Vital signs in last 24 hours: There were no vitals filed for this visit. Mental Status Evaluation:    Appearance age appropriate, casually dressed, overweight   Behavior cooperative, appears anxious   Speech normal rate, normal volume, normal pitch   Mood anxious   Affect constricted   Thought Processes organized, goal directed   Associations intact associations   Thought Content paranoid delusions   Perceptual Disturbances: no auditory hallucinations, no visual hallucinations   Abnormal Thoughts  Risk Potential Suicidal ideation - None  Homicidal ideation - None  Potential for aggression - No   Orientation oriented to person, place, time/date, and situation   Memory recent and remote memory grossly intact   Consciousness alert and awake   Attention Span Concentration Span attention span and concentration are age appropriate   Intellect appears to be of average intelligence   Insight fair   Judgement fair   Muscle Strength and  Gait normal muscle strength and normal muscle tone, normal gait and normal balance   Motor activity no abnormal movements   Language no difficulty naming common objects, no difficulty repeating a phrase   Fund of Knowledge adequate knowledge of current events  adequate fund of knowledge regarding past history  adequate fund of knowledge regarding vocabulary    Pain none   Pain Scale Did not ask patient to formally rate       Laboratory Results: I have personally reviewed all pertinent laboratory/tests results    Recent Labs (last 4 months):   No visits with results within 4 Month(s) from this visit. Latest known visit with results is:   No results found for any previous visit.        Suicide/Homicide Risk Assessment:    Risk of Harm to Self:  The following ratings are based on assessment at the time of the interview  Historical Risk Factors include: chronic psychiatric problems  Protective Factors: no current suicidal ideation, access to mental health treatment, being a parent, compliant with medications, compliant with mental health treatment, responsibilities and duties to others    Risk of Harm to Others: The following ratings are based on assessment at the time of the interview  Historical Risk Factors include: none. Protective Factors: no current homicidal ideation, being a parent, compliant with medications, compliant with mental health treatment, responsibilities and duties to others    The following interventions are recommended: contracts for safety at present - agrees to go to ED if feeling unsafe, contracts for safety at present - agrees to call Crisis Intervention Service if feeling unsafe      Lethality Statement:    Based on today's assessment and clinical criteria, Jose Alfredo Sahni contracts for safety and is not an imminent risk of harm to self or others. Outpatient level of care is deemed appropriate at this current time. Amberly Nick understands that if they can no longer contract for safety, they need to call the office or report to their nearest Emergency Room for immediate evaluation. They voiced understanding and agreement to call 911 or head to the nearest ED should they have any physical or mental decompensation whatsoever. Assessment/Plan:     1.)  ADHD  2.)  PTSD  3.)  JERRI  4.)  Paranoid personality disorder    Will start Abilify 5 mg daily to address paranoia with the intent of initiating antidepressant next week for the purposes of depression and anxiety and PTSD. Patient voiced understanding and agreement to crisis plan.   Adderall will remain at 30 mg twice daily          Medication management every 1 week  Aware of 24 hour and weekend coverage for urgent situations accessed by calling [facility] main practice number    Medications Risks/Benefits      Risks, Benefits And Possible Side Effects Of Medications:    Risks, benefits, and possible side effects of medications explained to Amberly Nick and she verbalizes understanding and agreement for treatment. Controlled Medication Discussion:     Sujey Davila has been filling controlled prescriptions on time as prescribed according to [address] [facility] [address] [facility]    Psychotherapy Provided:     Individual psychotherapy provided: Importance of medication and treatment compliance reviewed with Sujey Davila. Educated on importance of medication and treatment compliance. Importance of follow up with family physician for medical issues reviewed with Sujey Davila. Treatment Plan:    Completed and signed during the session: We will perform in next encounter due to lack of time today      Visit Time    Visit Start Time: 9:30 AM  Visit Stop Time: 10:00 AM  Total Visit Duration:  30 minutes     The total visit duration detailed above includes: patient engagement, medication management, psychotherapy/counseling, discussion regarding treatment goals, documentation, review of past medical records, and coordination of care. Note Share Disclaimer:      This note was not shared with the patient due to reasonable likelihood of causing patient harm      Jennifer Cho DO  Psychiatry  10/31/23

## 2023-11-05 RX ORDER — ARIPIPRAZOLE 5 MG/1
5 TABLET ORAL DAILY
Qty: 14 TABLET | Refills: 0 | Status: SHIPPED | OUTPATIENT
Start: 2023-11-05 | End: 2023-11-19

## 2023-11-07 ENCOUNTER — OFFICE VISIT (OUTPATIENT)
Dept: PSYCHIATRY | Facility: CLINIC | Age: 48
End: 2023-11-07
Payer: COMMERCIAL

## 2023-11-07 DIAGNOSIS — R04.2 HEMOPTYSIS: ICD-10-CM

## 2023-11-07 DIAGNOSIS — F90.2 ATTENTION DEFICIT HYPERACTIVITY DISORDER, COMBINED TYPE: ICD-10-CM

## 2023-11-07 DIAGNOSIS — F41.1 GAD (GENERALIZED ANXIETY DISORDER): Primary | ICD-10-CM

## 2023-11-07 PROCEDURE — 99214 OFFICE O/P EST MOD 30 MIN: CPT | Performed by: STUDENT IN AN ORGANIZED HEALTH CARE EDUCATION/TRAINING PROGRAM

## 2023-11-07 RX ORDER — DEXTROAMPHETAMINE SACCHARATE, AMPHETAMINE ASPARTATE, DEXTROAMPHETAMINE SULFATE AND AMPHETAMINE SULFATE 7.5; 7.5; 7.5; 7.5 MG/1; MG/1; MG/1; MG/1
TABLET ORAL
Qty: 60 TABLET | Refills: 0 | Status: SHIPPED | OUTPATIENT
Start: 2023-11-07

## 2023-11-07 RX ORDER — FLUOXETINE 10 MG/1
10 TABLET, FILM COATED ORAL DAILY
Qty: 30 TABLET | Refills: 0 | Status: SHIPPED | OUTPATIENT
Start: 2023-11-07 | End: 2023-12-07

## 2023-11-07 RX ORDER — DEXTROAMPHETAMINE SACCHARATE, AMPHETAMINE ASPARTATE, DEXTROAMPHETAMINE SULFATE AND AMPHETAMINE SULFATE 7.5; 7.5; 7.5; 7.5 MG/1; MG/1; MG/1; MG/1
TABLET ORAL
Qty: 60 TABLET | Refills: 0 | Status: SHIPPED | OUTPATIENT
Start: 2023-12-05

## 2023-11-08 NOTE — PSYCH
MEDICATION MANAGEMENT NOTE        [facility]      Name and Date of Birth:  Taylor Pineda 52 y.o. 1975 MRN: [id number]    Date of Visit: November 7, 2023    Reason for Visit: Follow-up visit for medication management       SUBJECTIVE:    Taylor Pineda is a 52 y.o. female with past psychiatric history significant for ADHD, PTSD, adjustment disorder with anxiety who was personally seen and evaluated today at the [address] [place] [address] outpatient clinic for follow-up and medication management. Tonya Heard denies SI, HI, AVH, milo today or since our last appointment. However, she endorses significant feelings of depression and anxiety due to being evicted within the next 2 to 3 days. She states that her previous ideas about where she would be able to live did not pan out and she states that she may have to go live with one of her family members while her daughter stays with her ex-. Patient stated that she believes that her search for living accommodations was sabotaged by her current apartment owners who had hacked into her computer and her "profile" and are actively monitoring and sabotaging her efforts in obtaining housing. She states that "I know computer engineering and this is not crazy or paranoid" when describing her adamant belief that somehow her apartment owners were evicting her have hacked into [employer] itself. This is in keeping with the patient's prior paranoid delusions described in previous notes. In addition to this, patient's paranoia manifests an extreme somatic complaints. She states that she constantly "coughed up blood" for years however the primary care physician that she had tried to get in contact with had "screwed up" or otherwise not followed up with her or refused to follow-up with her in a vaguely described way.   She states that she took the Abilify once at night and the next day felt a variety of different adverse side effects including but not limited to headache, nausea, vomiting, anger, depression amongst others. It became apparent that the patient's paranoid delusions are significantly interfering with her daily life and preventing her from obtaining housing and affecting her relationship with her family. Despite this, patient declined to initiate another medication to potentially help with this feeling of paranoia. Paranoid delusions were not directly confronted in a blunt manner to avoid harming the doctor-patient relationship. However, patient declined to initiate any antipsychotics that could potentially help her with mood stabilization as an added benefit. She instead voiced her strong preference for starting an antidepressant to deal with her depression and anxiety and after discussion of risks, benefits, potential side effects, alternatives, patient stated that she would like to try Prozac since it had worked for her years ago when she had been on it. Patient was initiated on 10 mg of Prozac per day with the intent of titrating it upwards as tolerated. Time was also spent again screening for history of milo which patient adamantly declined. Patient states that she continues to want to try and get better for the sake of her daughter who waited outside in the waiting room during the appointment. She voiced understanding and agreement at our crisis plan again today. Current Rating Scores:     None completed today.     Review Of Systems:      Constitutional negative   ENT negative other than chronic self reported hemoptysis unchanged since previous weeks   Cardiovascular negative   Respiratory negative   Gastrointestinal negative   Genitourinary negative   Musculoskeletal negative   Integumentary negative   Neurological negative   Endocrine negative   Other Symptoms none, all other systems are negative       Past Psychiatric History: (unchanged information from previous note copied and italicized) - Information that is bolded has been updated. Inpatient psychiatric admissions: Denies  Prior outpatient psychiatric linkage: [facility]  Past/current psychotherapy: None reported  History of suicidal attempts/gestures: At least 3 incidences of death wishes but denies harming self  History of violence/aggressive behaviors: None reported  Psychotropic medication trials: Wellbutrin, Effexor. Neither helped the patient for depression or anxiety. Prozac may have helped  Substance abuse inpatient/outpatient rehabilitation: None reported    Substance Abuse History: (unchanged information from previous note copied and italicized) - Information that is bolded has been updated. No past legal actions or arrests secondary to substance intoxication. The patient denies prior DWIs/DUIs. No history of outpatient/inpatient rehabilitation programs. Fay Vivas does not exhibit objective evidence of substance withdrawal during today's examination nor does Fay Vivas appear under the influence of any psychoactive substance. Denies any drugs or alcohol other than some cigarettes each day. Social History: (unchanged information from previous note copied and italicized) - Information that is bolded has been updated. Developmental: Denies a history of milestone/developmental delay. Denies a history of in-utero exposure to toxins/illicit substances. Needed some special education due to likely ADHD growing up  Education: high school diploma/GED  Marital history:   Living arrangement, social support: children, has 2 other grown children. Lives with 5year-old. States that she is in danger of losing her current residence due to financial difficulties  Occupational History: unemployed  Access to firearms: Denies direct access to weapons/firearms. Tania Yang has no history of arrests or violence with a deadly weapon.     Traumatic History: (unchanged information from previous note copied and italicized) - Information that is bolded has been updated. Abuse:verbal  Other Traumatic Events: None reported    Past Medical History:    No past medical history on file. No past surgical history on file. No Known Allergies    Substance Abuse History:    Social History     Substance and Sexual Activity   Alcohol Use Not on file     Social History     Substance and Sexual Activity   Drug Use Not on file       Social History:    Social History     Socioeconomic History    Marital status: Legally      Spouse name: Not on file    Number of children: Not on file    Years of education: Not on file    Highest education level: Not on file   Occupational History    Not on file   Tobacco Use    Smoking status: Some Days     Types: Cigarettes    Smokeless tobacco: Never   Substance and Sexual Activity    Alcohol use: Not on file    Drug use: Not on file    Sexual activity: Not on file   Other Topics Concern    Not on file   Social History Narrative    Not on file     Social Determinants of Health     Financial Resource Strain: Not on file   Food Insecurity: Not on file   Transportation Needs: Not on file   Physical Activity: Not on file   Stress: Not on file   Social Connections: Not on file   Intimate Partner Violence: Not on file   Housing Stability: Not on file       Family Psychiatric History:     No family history on file. History Review: The following portions of the patient's history were reviewed and updated as appropriate: allergies, current medications, past family history, past medical history, past social history, past surgical history, and problem list.         OBJECTIVE:     Vital signs in last 24 hours: There were no vitals filed for this visit.     Mental Status Evaluation:    Appearance age appropriate, casually dressed, overweight   Behavior cooperative, appears anxious   Speech normal rate, normal volume, normal pitch   Mood anxious   Affect constricted   Thought Processes organized, goal directed   Associations intact associations   Thought Content paranoid delusions   Perceptual Disturbances: no auditory hallucinations, no visual hallucinations   Abnormal Thoughts  Risk Potential Suicidal ideation - None  Homicidal ideation - None  Potential for aggression - No   Orientation oriented to person, place, time/date, and situation   Memory recent and remote memory grossly intact   Consciousness alert and awake   Attention Span Concentration Span attention span and concentration are age appropriate   Intellect appears to be of average intelligence   Insight fair   Judgement fair   Muscle Strength and  Gait normal muscle strength and normal muscle tone, normal gait and normal balance   Motor activity no abnormal movements   Language no difficulty naming common objects, no difficulty repeating a phrase   Fund of Knowledge adequate knowledge of current events  adequate fund of knowledge regarding past history  adequate fund of knowledge regarding vocabulary    Pain none   Pain Scale Did not ask patient to formally rate       Laboratory Results: I have personally reviewed all pertinent laboratory/tests results    Recent Labs (last 4 months):   No visits with results within 4 Month(s) from this visit. Latest known visit with results is:   No results found for any previous visit. Suicide/Homicide Risk Assessment:    Risk of Harm to Self:  The following ratings are based on assessment at the time of the interview  Historical Risk Factors include: chronic psychiatric problems  Protective Factors: no current suicidal ideation, access to mental health treatment, being a parent, compliant with medications, compliant with mental health treatment, responsibilities and duties to others    Risk of Harm to Others: The following ratings are based on assessment at the time of the interview  Historical Risk Factors include: none.   Protective Factors: no current homicidal ideation, being a parent, compliant with medications, compliant with mental health treatment, responsibilities and duties to others    The following interventions are recommended: contracts for safety at present - agrees to go to ED if feeling unsafe, contracts for safety at present - agrees to call Crisis Intervention Service if feeling unsafe      Lethality Statement:    Based on today's assessment and clinical criteria, Hector Lantigua contracts for safety and is not an imminent risk of harm to self or others. Outpatient level of care is deemed appropriate at this current time. Leida Pelletier understands that if they can no longer contract for safety, they need to call the office or report to their nearest Emergency Room for immediate evaluation. They voiced understanding and agreement to call 911 or head to the nearest ED should they have any physical or mental decompensation whatsoever. Assessment/Plan:     1.)  ADHD  2.)  PTSD  3.)  JERRI  4.)  Paranoid personality disorder      Abilify discontinued at patient request.  Will initiate Prozac 10 mg/day for depression and anxiety. Rest of regimen remains the same with Adderall 30 mg twice daily. Primary care referral made for patient's somatic and medical complaints and patient voiced understanding and agreement crisis plan. Patient stated that she had an adequate supply of her adderall until next week. Medication management every 1 week  Aware of 24 hour and weekend coverage for urgent situations accessed by calling [facility] main practice number    Medications Risks/Benefits      Risks, Benefits And Possible Side Effects Of Medications:    Risks, benefits, and possible side effects of medications explained to Leida Pelletier and she verbalizes understanding and agreement for treatment.     Controlled Medication Discussion:     Leida Pelletier has been filling controlled prescriptions on time as prescribed according to [address] [facility]    Psychotherapy Provided:     Individual psychotherapy provided: Importance of medication and treatment compliance reviewed with Augustine June. Educated on importance of medication and treatment compliance. Importance of follow up with family physician for medical issues reviewed with Augustine June. Treatment Plan:    Completed and signed during the session: We will perform in next encounter due to lack of time today      Visit Time    Visit Start Time: 9:30 AM  Visit Stop Time: 10:00 AM  Total Visit Duration:  30 minutes     The total visit duration detailed above includes: patient engagement, medication management, psychotherapy/counseling, discussion regarding treatment goals, documentation, review of past medical records, and coordination of care. Note Share Disclaimer:      This note was not shared with the patient due to reasonable likelihood of causing patient harm      Claudean Brewer DO  Psychiatry  11/07/23

## 2023-11-14 ENCOUNTER — TELEPHONE (OUTPATIENT)
Dept: PSYCHIATRY | Facility: CLINIC | Age: 48
End: 2023-11-14

## 2023-11-21 ENCOUNTER — OFFICE VISIT (OUTPATIENT)
Dept: PSYCHIATRY | Facility: CLINIC | Age: 48
End: 2023-11-21
Payer: COMMERCIAL

## 2023-11-21 DIAGNOSIS — F41.1 GAD (GENERALIZED ANXIETY DISORDER): Primary | ICD-10-CM

## 2023-11-21 PROCEDURE — 99212 OFFICE O/P EST SF 10 MIN: CPT | Performed by: STUDENT IN AN ORGANIZED HEALTH CARE EDUCATION/TRAINING PROGRAM

## 2023-11-21 RX ORDER — FLUOXETINE HYDROCHLORIDE 20 MG/1
20 CAPSULE ORAL DAILY
Qty: 30 CAPSULE | Refills: 2 | Status: SHIPPED | OUTPATIENT
Start: 2023-11-21 | End: 2023-11-21 | Stop reason: SDUPTHER

## 2023-11-21 RX ORDER — FLUOXETINE HYDROCHLORIDE 20 MG/1
20 CAPSULE ORAL DAILY
Qty: 30 CAPSULE | Refills: 2 | Status: SHIPPED | OUTPATIENT
Start: 2023-11-21 | End: 2024-02-19

## 2023-12-15 NOTE — PSYCH
MEDICATION MANAGEMENT NOTE        [facility]      Name and Date of Birth:  Paop Green 50 y.o. 1975 MRN: [id number]    Date of Visit: 11/21/2023    Reason for Visit: Follow-up visit for medication management       SUBJECTIVE:    Papo Green is a 50 y.o. female with past psychiatric history significant for ADHD, PTSD, adjustment disorder with anxiety who was personally seen and evaluated today at the [facility] outpatient clinic for follow-up and medication management. [place] denies SI, HI, AVH, milo today or since our last appointment. Patient states that she continues to have financial difficulties and that her daughter is now living at times with her ex-. However, she states that she is hopeful about procuring a job that works for her. She endorses improved mood and decreased feelings of depression and anxiety on Prozac 10 mg. After discussion of risks, benefits, the side effects, alternatives, will increase Prozac to 20 mg/day. Patient denies acute mental health complaints or concerns at this time and voices understanding and agreement to our crisis plan. Current Rating Scores:     None completed today. Review Of Systems:      Constitutional negative   ENT negative other than chronic self reported hemoptysis unchanged since previous weeks   Cardiovascular negative   Respiratory negative   Gastrointestinal negative   Genitourinary negative   Musculoskeletal negative   Integumentary negative   Neurological negative   Endocrine negative   Other Symptoms none, all other systems are negative       Past Psychiatric History: (unchanged information from previous note copied and italicized) - Information that is bolded has been updated.      Inpatient psychiatric admissions: Denies  Prior outpatient psychiatric linkage: [facility]  Past/current psychotherapy: None reported  History of suicidal attempts/gestures: At least 3 incidences of death wishes but denies harming self  History of violence/aggressive behaviors: None reported  Psychotropic medication trials: Wellbutrin, Effexor. Neither helped the patient for depression or anxiety. Prozac may have helped  Substance abuse inpatient/outpatient rehabilitation: None reported    Substance Abuse History: (unchanged information from previous note copied and italicized) - Information that is bolded has been updated. No past legal actions or arrests secondary to substance intoxication. The patient denies prior DWIs/DUIs. No history of outpatient/inpatient rehabilitation programs. Brijesh Garcia does not exhibit objective evidence of substance withdrawal during today's examination nor does Brijesh Garcia appear under the influence of any psychoactive substance. Denies any drugs or alcohol other than some cigarettes each day. Social History: (unchanged information from previous note copied and italicized) - Information that is bolded has been updated. Developmental: Denies a history of milestone/developmental delay. Denies a history of in-utero exposure to toxins/illicit substances. Needed some special education due to likely ADHD growing up  Education: high school diploma/GED  Marital history:   Living arrangement, social support: children, has 2 other grown children. Lives with 5year-old. States that she is in danger of losing her current residence due to financial difficulties  Occupational History: unemployed  Access to firearms: Denies direct access to weapons/firearms. Becky Stanford has no history of arrests or violence with a deadly weapon. Traumatic History: (unchanged information from previous note copied and italicized) - Information that is bolded has been updated. Abuse:verbal  Other Traumatic Events: None reported    Past Medical History:    No past medical history on file. No past surgical history on file.   No Known Allergies    Substance Abuse History:    Social History     Substance and Sexual Activity   Alcohol Use Not on file     Social History     Substance and Sexual Activity   Drug Use Not on file       Social History:    Social History     Socioeconomic History    Marital status: Legally      Spouse name: Not on file    Number of children: Not on file    Years of education: Not on file    Highest education level: Not on file   Occupational History    Not on file   Tobacco Use    Smoking status: Some Days     Types: Cigarettes    Smokeless tobacco: Never   Substance and Sexual Activity    Alcohol use: Not on file    Drug use: Not on file    Sexual activity: Not on file   Other Topics Concern    Not on file   Social History Narrative    Not on file     Social Determinants of Health     Financial Resource Strain: Not on file   Food Insecurity: Not on file   Transportation Needs: Not on file   Physical Activity: Not on file   Stress: Stress Concern Present (3/19/2021)    Received from Álvaro     Feeling of Stress : To some extent   Social Connections: Not on file   Intimate Partner Violence: Not on file   Housing Stability: Not on file       Family Psychiatric History:     No family history on file. History Review: The following portions of the patient's history were reviewed and updated as appropriate: allergies, current medications, past family history, past medical history, past social history, past surgical history, and problem list.         OBJECTIVE:     Vital signs in last 24 hours: There were no vitals filed for this visit.     Mental Status Evaluation:    Appearance age appropriate, casually dressed, overweight   Behavior cooperative, appears anxious   Speech normal rate, normal volume, normal pitch   Mood anxious   Affect constricted   Thought Processes organized, goal directed   Associations intact associations   Thought Content paranoid delusions   Perceptual Disturbances: no auditory hallucinations, no visual hallucinations   Abnormal Thoughts  Risk Potential Suicidal ideation - None  Homicidal ideation - None  Potential for aggression - No   Orientation oriented to person, place, time/date, and situation   Memory recent and remote memory grossly intact   Consciousness alert and awake   Attention Span Concentration Span attention span and concentration are age appropriate   Intellect appears to be of average intelligence   Insight fair   Judgement fair   Muscle Strength and  Gait normal muscle strength and normal muscle tone, normal gait and normal balance   Motor activity no abnormal movements   Language no difficulty naming common objects, no difficulty repeating a phrase   Fund of Knowledge adequate knowledge of current events  adequate fund of knowledge regarding past history  adequate fund of knowledge regarding vocabulary    Pain none   Pain Scale Did not ask patient to formally rate       Laboratory Results: I have personally reviewed all pertinent laboratory/tests results    Recent Labs (last 4 months):   No visits with results within 4 Month(s) from this visit. Latest known visit with results is:   No results found for any previous visit. Suicide/Homicide Risk Assessment:    Risk of Harm to Self:  The following ratings are based on assessment at the time of the interview  Historical Risk Factors include: chronic psychiatric problems  Protective Factors: no current suicidal ideation, access to mental health treatment, being a parent, compliant with medications, compliant with mental health treatment, responsibilities and duties to others    Risk of Harm to Others: The following ratings are based on assessment at the time of the interview  Historical Risk Factors include: none.   Protective Factors: no current homicidal ideation, being a parent, compliant with medications, compliant with mental health treatment, responsibilities and duties to others    The following interventions are recommended: contracts for safety at present - agrees to go to ED if feeling unsafe, contracts for safety at present - agrees to call Crisis Intervention Service if feeling unsafe      Lethality Statement:    Based on today's assessment and clinical criteria, Aaron Benítez contracts for safety and is not an imminent risk of harm to self or others. Outpatient level of care is deemed appropriate at this current time. Italo Curtis understands that if they can no longer contract for safety, they need to call the office or report to their nearest Emergency Room for immediate evaluation. They voiced understanding and agreement to call 911 or head to the nearest ED should they have any physical or mental decompensation whatsoever. Assessment/Plan:     1.)  ADHD  2.)  PTSD  3.)  JERRI  4.)  Paranoid personality disorder      Prozac increased to 20 mg/day. Rest of regimen remains the same. Medication management every 1 week  Aware of 24 hour and weekend coverage for urgent situations accessed by calling [facility] main practice number    Medications Risks/Benefits      Risks, Benefits And Possible Side Effects Of Medications:    Risks, benefits, and possible side effects of medications explained to Italo Curtis and she verbalizes understanding and agreement for treatment. Controlled Medication Discussion:     Italo Curtis has been filling controlled prescriptions on time as prescribed according to [address] [facility]    Psychotherapy Provided:     Individual psychotherapy provided: Importance of medication and treatment compliance reviewed with Italo Curtis. Educated on importance of medication and treatment compliance. Importance of follow up with family physician for medical issues reviewed with Italo Curtis.      Treatment Plan:    Completed and signed during the session: We will perform in next encounter due to lack of time today      Visit Time    Visit Start Time: 11:00 AM  Visit Stop Time: 11:15 AM  Total Visit Duration:  15 minutes     The total visit duration detailed above includes: patient engagement, medication management, psychotherapy/counseling, discussion regarding treatment goals, documentation, review of past medical records, and coordination of care. Note Share Disclaimer:      This note was not shared with the patient due to reasonable likelihood of causing patient harm      Joseluis Meza DO  Psychiatry

## 2024-01-16 DIAGNOSIS — F90.2 ATTENTION DEFICIT HYPERACTIVITY DISORDER, COMBINED TYPE: ICD-10-CM

## 2024-01-16 RX ORDER — DEXTROAMPHETAMINE SACCHARATE, AMPHETAMINE ASPARTATE, DEXTROAMPHETAMINE SULFATE AND AMPHETAMINE SULFATE 7.5; 7.5; 7.5; 7.5 MG/1; MG/1; MG/1; MG/1
TABLET ORAL
Qty: 60 TABLET | Refills: 0 | Status: SHIPPED | OUTPATIENT
Start: 2024-01-16

## 2024-01-22 ENCOUNTER — OFFICE VISIT (OUTPATIENT)
Dept: PSYCHIATRY | Facility: CLINIC | Age: 49
End: 2024-01-22
Payer: COMMERCIAL

## 2024-01-22 DIAGNOSIS — F60.0 PARANOID PERSONALITY (DISORDER) (HCC): Primary | ICD-10-CM

## 2024-01-22 PROCEDURE — 99214 OFFICE O/P EST MOD 30 MIN: CPT | Performed by: STUDENT IN AN ORGANIZED HEALTH CARE EDUCATION/TRAINING PROGRAM

## 2024-01-22 NOTE — PSYCH
"Client Name: Shani Gallegos       Client YOB: 1975  : 1975    Treatment Team:     Psychiatrist: Steven Baum DO    Healthcare Provider  Dionicio Daniel MD  [address] [facility] [address]        Plan Type: adolescent/adult (14 and over) Initial    My Personal Strengths are (in the client's own words):  \"***\"    The stressors and triggers that may put me at risk are:  {Stressors:69220}    Coping skills I can use to keep myself calm and safe:  {EFO Crisis Plan Coping Skills:52361}    Coping skills/supports I can use to maintain abstinence from substance use:   {EFO Crisis Plan Substance Copin::\"Not Applicable\"}    The people that provide me with help and support: (Include name, contact, and how they can help)    Support Persons #1:   *No emergency contact information on file.   *How they can help me? \"***\"    Support person #2: {EFO Crisis Plan Supportive Person:79674}   * Phone number: {EFO Support Person Phone:36473::\"in cell phone\"}   * How can they help me? \"***\"     Support person #3: {EFO Crisis Plan Supportive Person:42559}    * Phone number: {EFO Support Person Phone:95059::\"in cell phone\"}    * How can they help me? \"***\"    In the past, the following has helped me in times of crisis:    {AMB PSYCH/BH Things that help:50087}      If it is an emergency and you need immediate help, call     If there is a possibility of danger to yourself or others, call the following crisis hotline resources:     Adult Crisis Numbers  Suicide Prevention Hotline - Dial   [facility]: [phone]  [facility]: [phone]  [facility]: [phone]  [facility]: [phone]  [place]: [phone]  [facility]: [phone]  [facility]: [phone]  [place] [facility]: [phone] (daytime).       [phone] (after hours, weekends, holidays)     Child/Adolescent Crisis Numbers   [facility]: " [phone]   [facility]: [phone]   [place]: [phone]   [facility]: [phone]    Please note: Some [facility] do not have a separate number for Child/Adolescent specific crisis. If your county is not listed under Child/Adolescent, please call the adult number for your county     National Talk to Text Line   All Ages - [phone]    In the event your feelings become unmanageable, and you cannot reach your support system, you will call 911 immediately or go to the nearest hospital emergency room. If you have any physical or medical emergency or feel as if you are in physical/medical distress, call 911 immediately or go to the nearest hospital emergency room.

## 2024-01-22 NOTE — PSYCH
MEDICATION MANAGEMENT NOTE        [facility]      Name and Date of Birth:  Shani Gallegos 48 y.o. 1975 MRN: [id number]    Date of Visit: 1/22/2024    Reason for Visit: Follow-up visit for medication management       SUBJECTIVE:    Shani Gallegos is a 48 y.o. female with past psychiatric history significant for ADHD, PTSD, paranoid personality disorder y who was personally seen and evaluated today at the [facility] outpatient clinic for follow-up and medication management. Shani denies SI, HI, AVH, milo today or since our last appointment.  Patient states that she feels as if her life is progressing well and she has started working as a  again.  She continues to have interpersonal difficulties between herself, her ex- and her daughter.  However, she states that she feels more hopeful about the future.  After discussion of risks, benefits, and side effects, alternatives, patient concedes that though she has not been consistent with taking her medications, she would like to continue on current regimen as is without any changes.  Patient continues to express paranoid traits at her appointment, continues to believe that various actors or malicious forces are interfering with her use of technology and causing her phone to malfunction.  She remains engaged with family, work and personal hobbies.    Current Rating Scores:     None completed today.    Review Of Systems:      Constitutional negative   ENT negative other than chronic self reported hemoptysis unchanged since previous weeks   Cardiovascular negative   Respiratory negative   Gastrointestinal negative   Genitourinary negative   Musculoskeletal negative   Integumentary negative   Neurological negative   Endocrine negative   Other Symptoms none, all other systems are negative       Past Psychiatric History: (unchanged information from previous note copied and  italicized) - Information that is bolded has been updated.     Inpatient psychiatric admissions: Denies  Prior outpatient psychiatric linkage: [facility]  Past/current psychotherapy: None reported  History of suicidal attempts/gestures: At least 3 incidences of death wishes but denies harming self  History of violence/aggressive behaviors: None reported  Psychotropic medication trials: Wellbutrin, Effexor.  Neither helped the patient for depression or anxiety. Prozac may have helped  Substance abuse inpatient/outpatient rehabilitation: None reported    Substance Abuse History: (unchanged information from previous note copied and italicized) - Information that is bolded has been updated.      No past legal actions or arrests secondary to substance intoxication. The patient denies prior DWIs/DUIs. No history of outpatient/inpatient rehabilitation programs. Shani does not exhibit objective evidence of substance withdrawal during today's examination nor does Shani appear under the influence of any psychoactive substance.       Denies any drugs or alcohol other than some cigarettes each day.    Social History: (unchanged information from previous note copied and italicized) - Information that is bolded has been updated.     Developmental: Denies a history of milestone/developmental delay. Denies a history of in-utero exposure to toxins/illicit substances.  Needed some special education due to likely ADHD growing up  Education: high school diploma/GED  Marital history:   Living arrangement, social support: children, has 2 other grown children.  Lives with 9-year-old.  States that she is in danger of losing her current residence due to financial difficulties  Occupational History: unemployed  Access to firearms: Denies direct access to weapons/firearms. Shani Gallegos has no history of arrests or violence with a deadly weapon.    Traumatic History: (unchanged information from previous note copied and  italicized) - Information that is bolded has been updated.       Abuse:verbal  Other Traumatic Events: None reported    Past Medical History:    No past medical history on file.     No past surgical history on file.  No Known Allergies    Substance Abuse History:    Social History     Substance and Sexual Activity   Alcohol Use Not on file     Social History     Substance and Sexual Activity   Drug Use Not on file       Social History:    Social History     Socioeconomic History    Marital status: Legally      Spouse name: Not on file    Number of children: Not on file    Years of education: Not on file    Highest education level: Not on file   Occupational History    Not on file   Tobacco Use    Smoking status: Some Days     Types: Cigarettes    Smokeless tobacco: Never   Substance and Sexual Activity    Alcohol use: Not on file    Drug use: Not on file    Sexual activity: Not on file   Other Topics Concern    Not on file   Social History Narrative    Not on file     Social Determinants of Health     Financial Resource Strain: Not on file   Food Insecurity: Not on file   Transportation Needs: Not on file   Physical Activity: Not on file   Stress: Stress Concern Present (3/19/2021)    Received from [facility] - Occupational Stress Questionnaire     Feeling of Stress : To some extent   Social Connections: Not on file   Intimate Partner Violence: Not on file   Housing Stability: Not on file       Family Psychiatric History:     No family history on file.    History Review: The following portions of the patient's history were reviewed and updated as appropriate: allergies, current medications, past family history, past medical history, past social history, past surgical history, and problem list.         OBJECTIVE:     Vital signs in last 24 hours:    There were no vitals filed for this visit.    Mental Status Evaluation:    Appearance age  "appropriate, casually dressed, overweight   Behavior cooperative, appears anxious   Speech normal rate, normal volume, normal pitch   Mood \"Better\"   Affect constricted   Thought Processes organized, goal directed   Associations intact associations   Thought Content paranoid delusions   Perceptual Disturbances: no auditory hallucinations, no visual hallucinations   Abnormal Thoughts  Risk Potential Suicidal ideation - None  Homicidal ideation - None  Potential for aggression - No   Orientation oriented to person, place, time/date, and situation   Memory recent and remote memory grossly intact   Consciousness alert and awake   Attention Span Concentration Span attention span and concentration are age appropriate   Intellect appears to be of average intelligence   Insight fair   Judgement fair   Muscle Strength and  Gait normal muscle strength and normal muscle tone, normal gait and normal balance   Motor activity no abnormal movements   Language no difficulty naming common objects, no difficulty repeating a phrase   Fund of Knowledge adequate knowledge of current events  adequate fund of knowledge regarding past history  adequate fund of knowledge regarding vocabulary    Pain none   Pain Scale Did not ask patient to formally rate       Laboratory Results: I have personally reviewed all pertinent laboratory/tests results    Recent Labs (last 4 months):   No visits with results within 4 Month(s) from this visit.   Latest known visit with results is:   No results found for any previous visit.       Suicide/Homicide Risk Assessment:    Risk of Harm to Self:  The following ratings are based on assessment at the time of the interview  Historical Risk Factors include: chronic psychiatric problems  Protective Factors: no current suicidal ideation, access to mental health treatment, being a parent, compliant with medications, compliant with mental health treatment, responsibilities and duties to others    Risk of Harm to " Others:  The following ratings are based on assessment at the time of the interview  Historical Risk Factors include: none.  Protective Factors: no current homicidal ideation, being a parent, compliant with medications, compliant with mental health treatment, responsibilities and duties to others    The following interventions are recommended: contracts for safety at present - agrees to go to ED if feeling unsafe, contracts for safety at present - agrees to call Crisis Intervention Service if feeling unsafe      Lethality Statement:    Based on today's assessment and clinical criteria, Shani Gallegos contracts for safety and is not an imminent risk of harm to self or others. Outpatient level of care is deemed appropriate at this current time. Shani understands that if they can no longer contract for safety, they need to call the office or report to their nearest Emergency Room for immediate evaluation. They voiced understanding and agreement to call 911 or head to the nearest ED should they have any physical or mental decompensation whatsoever.       Assessment/Plan:     1.)  ADHD  2.)  PTSD  3.)  JRERI  4.)  Paranoid personality disorder    After discussion of risks, benefits, and side effects, alternatives, we will continue on current regimen as is without any changes at patient request.  Patient voices understanding and agreement to continue following with a primary care physician.  Patient will continue to take Adderall 30 mg twice daily and fluoxetine 20 mg daily.        Medications Risks/Benefits      Risks, Benefits And Possible Side Effects Of Medications:    Risks, benefits, and possible side effects of medications explained to Shani and she verbalizes understanding and agreement for treatment.    Controlled Medication Discussion:     Shani has been filling controlled prescriptions on time as prescribed according to [place] Prescription Drug Monitoring Program    Psychotherapy Provided:      Individual psychotherapy provided: Importance of medication and treatment compliance reviewed with Shani.  Educated on importance of medication and treatment compliance.  Importance of follow up with family physician for medical issues reviewed with Shani.     Treatment Plan:    Completed and signed during the session:  We will perform in next encounter due to lack of time today      Visit Time    Visit Start Time: 9:30 AM  Visit Stop Time: 9:45 AM  Total Visit Duration:  15 minutes     The total visit duration detailed above includes: patient engagement, medication management, psychotherapy/counseling, discussion regarding treatment goals, documentation, review of past medical records, and coordination of care.      Note Share Disclaimer:     This note was not shared with the patient due to reasonable likelihood of causing patient harm      Steven Baum DO  Psychiatry

## 2024-01-24 ENCOUNTER — TELEPHONE (OUTPATIENT)
Dept: PSYCHIATRY | Facility: CLINIC | Age: 49
End: 2024-01-24

## 2024-01-26 ENCOUNTER — DOCUMENTATION (OUTPATIENT)
Dept: PSYCHIATRY | Facility: CLINIC | Age: 49
End: 2024-01-26

## 2024-02-13 ENCOUNTER — TELEPHONE (OUTPATIENT)
Dept: PSYCHIATRY | Facility: CLINIC | Age: 49
End: 2024-02-13

## 2024-02-13 NOTE — TELEPHONE ENCOUNTER
Intake received an email from [facility] stating Bautista is recommending therapy for patient. Writer called and LVM to call intake dept.

## 2024-02-19 DIAGNOSIS — F90.2 ATTENTION DEFICIT HYPERACTIVITY DISORDER, COMBINED TYPE: ICD-10-CM

## 2024-02-19 RX ORDER — DEXTROAMPHETAMINE SACCHARATE, AMPHETAMINE ASPARTATE, DEXTROAMPHETAMINE SULFATE AND AMPHETAMINE SULFATE 7.5; 7.5; 7.5; 7.5 MG/1; MG/1; MG/1; MG/1
TABLET ORAL
Qty: 60 TABLET | Refills: 0 | Status: SHIPPED | OUTPATIENT
Start: 2024-02-19

## 2024-03-22 DIAGNOSIS — F90.2 ATTENTION DEFICIT HYPERACTIVITY DISORDER, COMBINED TYPE: ICD-10-CM

## 2024-03-22 RX ORDER — DEXTROAMPHETAMINE SACCHARATE, AMPHETAMINE ASPARTATE, DEXTROAMPHETAMINE SULFATE AND AMPHETAMINE SULFATE 7.5; 7.5; 7.5; 7.5 MG/1; MG/1; MG/1; MG/1
TABLET ORAL
Qty: 60 TABLET | Refills: 0 | Status: SHIPPED | OUTPATIENT
Start: 2024-03-22

## 2024-03-22 NOTE — TELEPHONE ENCOUNTER
Patient requesting RF of adderall. Reports she does not have enough for the weekend. Next appt scheduled 4/15.

## 2024-04-01 ENCOUNTER — APPOINTMENT (OUTPATIENT)
Dept: RADIOLOGY | Facility: CLINIC | Age: 49
End: 2024-04-01

## 2024-04-01 ENCOUNTER — OFFICE VISIT (OUTPATIENT)
Dept: URGENT CARE | Facility: CLINIC | Age: 49
End: 2024-04-01

## 2024-04-01 VITALS
OXYGEN SATURATION: 97 % | DIASTOLIC BLOOD PRESSURE: 98 MMHG | RESPIRATION RATE: 18 BRPM | HEART RATE: 104 BPM | SYSTOLIC BLOOD PRESSURE: 158 MMHG

## 2024-04-01 DIAGNOSIS — M22.2X1 PATELLOFEMORAL SYNDROME OF RIGHT KNEE: Primary | ICD-10-CM

## 2024-04-01 DIAGNOSIS — M25.561 ACUTE PAIN OF RIGHT KNEE: ICD-10-CM

## 2024-04-01 PROCEDURE — 73562 X-RAY EXAM OF KNEE 3: CPT

## 2024-04-01 PROCEDURE — 99213 OFFICE O/P EST LOW 20 MIN: CPT | Performed by: PHYSICIAN ASSISTANT

## 2024-04-01 RX ORDER — MELOXICAM 15 MG/1
15 TABLET ORAL DAILY
Qty: 15 TABLET | Refills: 0 | Status: SHIPPED | OUTPATIENT
Start: 2024-04-01

## 2024-04-01 NOTE — PROGRESS NOTES
[facility]        NAME: Shani Gallegos is a 48 y.o. female  : 1975    MRN: [id number]  DATE: 2024  TIME: 4:03 PM    Assessment and Plan   Patellofemoral syndrome of right knee [M22.2X1]  1. Patellofemoral syndrome of right knee  Ambulatory Referral to Physical Therapy    Ambulatory Referral to Orthopedic Surgery    meloxicam (Mobic) 15 mg tablet      2. Acute pain of right knee  XR knee 3 vw right non injury            Patient Instructions   Her pain seems primarily patellofemoral in nature although she does have some medial joint line tenderness (although no mechanical symptoms) Start Mobic and PT.  I explained the PFS will take many weeks to improve.  Follow up in 2 weeks to follow up on PFS and ensure medial pain does not persist.   Start Mobic 1 pill daily - may take Tylenol with this but no Motrin/Advil/Aleve  Ice the knee  Start PT  Follow up with orthopedics   Follow up with PCP in 3-5 days.  Proceed to  ER if symptoms worsen.    If tests have been performed at [facility], our office will contact you with results if changes need to be made to the care plan discussed with you at the visit.  You can review your full results on [facility] MyChart.    Chief Complaint     Chief Complaint   Patient presents with    Knee Pain     Patient has right knee pain that started about a week or so ago. Denies any specific injury. Has edema and stiffness of the knee         History of Present Illness       Knee Pain       49 y/o female presents for evaluation of right knee pain which started one week ago without injury.  She localized the pain to the front and medial aspect of the knee.  She c/o popping under the knee cap.  She denies locking/catching/instability.  She states if she twists the wrong way she gets a sharp pain in the knee.  She mentions that she has been struggling with heel pain as well and really struggles to walk in the morning due to the heel pain. She has used occasional NSAID's  with minimal improvement.    She reports a h/o leg problems - she had to wear a leg brace on the left leg when she was young but does not recall the reason for bracing.  She also was in braces because her feet turned in.  More recently she recalls wearing a brace on her right knee which she removed at night but does not recall the correlating diagnosis.   PMH: ADHD  Review of Systems   Review of Systems   Constitutional:  Negative for chills and fever.   HENT:  Negative for ear pain and sore throat.    Eyes:  Negative for pain and visual disturbance.   Respiratory:  Negative for cough and shortness of breath.    Cardiovascular:  Negative for chest pain and palpitations.   Gastrointestinal:  Negative for abdominal pain and vomiting.   Genitourinary:  Negative for dysuria and hematuria.   Musculoskeletal:  Positive for arthralgias, gait problem and myalgias. Negative for back pain.   Skin:  Negative for color change and rash.   Neurological:  Negative for seizures and syncope.   All other systems reviewed and are negative.        Current Medications       Current Outpatient Medications:     meloxicam (Mobic) 15 mg tablet, Take 1 tablet (15 mg total) by mouth daily, Disp: 15 tablet, Rfl: 0    amphetamine-dextroamphetamine (ADDERALL) 30 MG tablet, Take 1 PO BID, Disp: 60 tablet, Rfl: 0    amphetamine-dextroamphetamine (ADDERALL) 30 MG tablet, TAKE 1 TABLET BY MOUTH TWICE A DAY, Disp: 60 tablet, Rfl: 0    amphetamine-dextroamphetamine (ADDERALL, 30MG,) 30 MG tablet, Take 1 PO BID, Disp: 60 tablet, Rfl: 0    amphetamine-dextroamphetamine (ADDERALL, 30MG,) 30 MG tablet, Take 1 PO BID Do not start before December 5, 2023., Disp: 60 tablet, Rfl: 0    FLUoxetine (PROzac) 20 mg capsule, Take 1 capsule (20 mg total) by mouth daily, Disp: 30 capsule, Rfl: 2    Current Allergies     Allergies as of 04/01/2024    (No Known Allergies)            The following portions of the patient's history were reviewed and updated as  appropriate: allergies, current medications, past family history, past medical history, past social history, past surgical history and problem list.     No past medical history on file.    No past surgical history on file.    No family history on file.      Medications have been verified.        Objective   /98   Pulse 104   Resp 18   SpO2 97%   No LMP recorded.       Physical Exam     Physical Exam  Vitals and nursing note reviewed.   Constitutional:       General: She is not in acute distress.     Appearance: Normal appearance.   HENT:      Head: Normocephalic and atraumatic.   Cardiovascular:      Rate and Rhythm: Normal rate and regular rhythm.      Pulses: Normal pulses.      Heart sounds: Normal heart sounds.   Pulmonary:      Effort: Pulmonary effort is normal.      Breath sounds: Normal breath sounds.   Musculoskeletal:      Comments: Right knee:  No erythema, no ecchymosis  TR effusion  Tender over medial joint line, no lateral joint line tenderness  Mild tenderness over the pes bursa  ROM:  Flexion: 130 with crepitus  Ext: 0  Neg medial/lateral Rubin's  Neg Lachman  No instability to varus/valgus stress     Bilat feet:  No pes planus/cavus deformity  She is tender over the retrocalcaneal bursa.  Non-tender over the plantar fascia.   DF: 5-10 degrees    Skin:     General: Skin is warm and dry.   Neurological:      Mental Status: She is alert and oriented to person, place, and time.   Psychiatric:         Mood and Affect: Mood normal.         Behavior: Behavior normal.       Xray: 4 view right knee  Preliminary reading: no acute fracture.  Medial, lateral and patellofemoral joint spaces are well maintained.  Await final reading.

## 2024-04-01 NOTE — PATIENT INSTRUCTIONS
Start Mobic 1 pill daily - may take Tylenol with this but no Motrin/Advil/Aleve  Ice the knee  Start PT  Follow up with orthopedics   Follow up with PCP in 3-5 days.  Proceed to  ER if symptoms worsen.    If tests have been performed at [facility], our office will contact you with results if changes need to be made to the care plan discussed with you at the visit.  You can review your full results on [facility] MyChart.

## 2024-04-11 ENCOUNTER — TELEPHONE (OUTPATIENT)
Dept: PSYCHIATRY | Facility: CLINIC | Age: 49
End: 2024-04-11

## 2024-04-11 NOTE — TELEPHONE ENCOUNTER
Patient called in to reschedule therapy session. Patient just wanted first available. Patient has been scheduled with Prerna on 4/18 @ 11am.

## 2024-04-15 ENCOUNTER — OFFICE VISIT (OUTPATIENT)
Dept: PSYCHIATRY | Facility: CLINIC | Age: 49
End: 2024-04-15
Payer: COMMERCIAL

## 2024-04-15 DIAGNOSIS — F60.0 PARANOID PERSONALITY (DISORDER) (HCC): Primary | ICD-10-CM

## 2024-04-15 DIAGNOSIS — F90.2 ATTENTION DEFICIT HYPERACTIVITY DISORDER, COMBINED TYPE: ICD-10-CM

## 2024-04-15 DIAGNOSIS — F41.1 GENERALIZED ANXIETY DISORDER: ICD-10-CM

## 2024-04-15 PROCEDURE — 99214 OFFICE O/P EST MOD 30 MIN: CPT | Performed by: STUDENT IN AN ORGANIZED HEALTH CARE EDUCATION/TRAINING PROGRAM

## 2024-04-15 RX ORDER — DEXTROAMPHETAMINE SACCHARATE, AMPHETAMINE ASPARTATE, DEXTROAMPHETAMINE SULFATE AND AMPHETAMINE SULFATE 7.5; 7.5; 7.5; 7.5 MG/1; MG/1; MG/1; MG/1
TABLET ORAL
Qty: 60 TABLET | Refills: 0 | Status: SHIPPED | OUTPATIENT
Start: 2024-06-10

## 2024-04-15 RX ORDER — DEXTROAMPHETAMINE SACCHARATE, AMPHETAMINE ASPARTATE, DEXTROAMPHETAMINE SULFATE AND AMPHETAMINE SULFATE 7.5; 7.5; 7.5; 7.5 MG/1; MG/1; MG/1; MG/1
TABLET ORAL
Qty: 60 TABLET | Refills: 0 | Status: SHIPPED | OUTPATIENT
Start: 2024-04-15

## 2024-04-15 RX ORDER — DEXTROAMPHETAMINE SACCHARATE, AMPHETAMINE ASPARTATE, DEXTROAMPHETAMINE SULFATE AND AMPHETAMINE SULFATE 7.5; 7.5; 7.5; 7.5 MG/1; MG/1; MG/1; MG/1
TABLET ORAL
Qty: 60 TABLET | Refills: 0 | Status: SHIPPED | OUTPATIENT
Start: 2024-05-13

## 2024-04-15 NOTE — PSYCH
"MEDICATION MANAGEMENT NOTE        [facility]      Name and Date of Birth:  Shani Gallegos 48 y.o. 1975 MRN: [id number]    Date of Visit: 4/15/2024    Reason for Visit: Follow-up visit for medication management       SUBJECTIVE:    Shani Gallegos is a 48 y.o. female with past psychiatric history significant for ADHD, PTSD, paranoid personality disorder y who was personally seen and evaluated today at the [facility] outpatient clinic for follow-up and medication management. Shani denies SI, HI, AVH, milo today or since our last appointment.  Patient admits to not being compliant with her fluoxetine and stated that she does not feel as if she needs it anymore and declines to continue it.  She denies feeling significantly depressed or anxious or having any use for the fluoxetine.  She states that she may have had side effects to it in the past however denied having side effects to it at this time and simply stated that she is not willing to consider any alternatives to fluoxetine either.  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  This is in line with patient's previous paranoia which often gets in the way of her accomplishing various tasks in her life however patient is not open to considering alternative explanations for this.  Patient is meeting with her therapist soon and so this writer will defer modifying and confronting patient's paranoid ideations to the therapist.  As of now, patient cites significant benefit with her mood and focus on Adderall 30 mg twice daily and states that this is the only medication she is willing to take at this time.  She denies any acute mental complaints or concerns and remains engaged with her " family and is looking for a new job.      Current Rating Scores:     None completed today.    Review Of Systems:      Constitutional negative   ENT negative other than chronic self reported hemoptysis unchanged since previous weeks   Cardiovascular negative   Respiratory negative   Gastrointestinal negative   Genitourinary negative   Musculoskeletal negative   Integumentary negative   Neurological negative   Endocrine negative   Other Symptoms none, all other systems are negative       Past Psychiatric History: (unchanged information from previous note copied and italicized) - Information that is bolded has been updated.     Inpatient psychiatric admissions: Denies  Prior outpatient psychiatric linkage: [facility]  Past/current psychotherapy: None reported  History of suicidal attempts/gestures: At least 3 incidences of death wishes but denies harming self  History of violence/aggressive behaviors: None reported  Psychotropic medication trials: Wellbutrin, Effexor.  Neither helped the patient for depression or anxiety. Prozac may have helped  Substance abuse inpatient/outpatient rehabilitation: None reported    Substance Abuse History: (unchanged information from previous note copied and italicized) - Information that is bolded has been updated.      No past legal actions or arrests secondary to substance intoxication. The patient denies prior DWIs/DUIs. No history of outpatient/inpatient rehabilitation programs. Shani does not exhibit objective evidence of substance withdrawal during today's examination nor does Shani appear under the influence of any psychoactive substance.       Denies any drugs or alcohol other than some cigarettes each day.    Social History: (unchanged information from previous note copied and italicized) - Information that is bolded has been updated.     Developmental: Denies a history of milestone/developmental delay. Denies a history of in-utero exposure to toxins/illicit  substances.  Needed some special education due to likely ADHD growing up  Education: high school diploma/GED  Marital history:   Living arrangement, social support: children, has 2 other grown children.  Lives with 9-year-old.  States that she is in danger of losing her current residence due to financial difficulties  Occupational History: unemployed  Access to firearms: Denies direct access to weapons/firearms. Shani Gallegos has no history of arrests or violence with a deadly weapon.    Traumatic History: (unchanged information from previous note copied and italicized) - Information that is bolded has been updated.       Abuse:verbal  Other Traumatic Events: None reported    Past Medical History:    No past medical history on file.     No past surgical history on file.  No Known Allergies    Substance Abuse History:    Social History     Substance and Sexual Activity   Alcohol Use Not on file     Social History     Substance and Sexual Activity   Drug Use Not on file       Social History:    Social History     Socioeconomic History    Marital status: Legally      Spouse name: Not on file    Number of children: Not on file    Years of education: Not on file    Highest education level: Not on file   Occupational History    Not on file   Tobacco Use    Smoking status: Some Days     Types: Cigarettes    Smokeless tobacco: Never   Substance and Sexual Activity    Alcohol use: Not on file    Drug use: Not on file    Sexual activity: Not on file   Other Topics Concern    Not on file   Social History Narrative    Not on file     Social Determinants of Health     Financial Resource Strain: Not on file   Food Insecurity: Not on file   Transportation Needs: Not on file   Physical Activity: Not on file   Stress: Stress Concern Present (3/19/2021)    Received from [facility] - Occupational Stress Questionnaire     Feeling of Stress : To  "some extent   Social Connections: Not on file   Intimate Partner Violence: Not on file   Housing Stability: Not on file       Family Psychiatric History:     No family history on file.    History Review: The following portions of the patient's history were reviewed and updated as appropriate: allergies, current medications, past family history, past medical history, past social history, past surgical history, and problem list.         OBJECTIVE:     Vital signs in last 24 hours:    There were no vitals filed for this visit.    Mental Status Evaluation:    Appearance age appropriate, casually dressed, overweight   Behavior Cooperative, appears mildly anxious   Speech normal rate, normal volume, normal pitch   Mood \"Okay\"   Affect constricted   Thought Processes organized, goal directed   Associations intact associations   Thought Content paranoid delusions   Perceptual Disturbances: no auditory hallucinations, no visual hallucinations   Abnormal Thoughts  Risk Potential Suicidal ideation - None  Homicidal ideation - None  Potential for aggression - No   Orientation oriented to person, place, time/date, and situation   Memory recent and remote memory grossly intact   Consciousness alert and awake   Attention Span Concentration Span attention span and concentration are age appropriate   Intellect appears to be of average intelligence   Insight fair   Judgement fair   Muscle Strength and  Gait normal muscle strength and normal muscle tone, normal gait and normal balance   Motor activity no abnormal movements   Language no difficulty naming common objects, no difficulty repeating a phrase   Fund of Knowledge adequate knowledge of current events  adequate fund of knowledge regarding past history  adequate fund of knowledge regarding vocabulary    Pain none   Pain Scale Did not ask patient to formally rate       Laboratory Results: I have personally reviewed all pertinent laboratory/tests results    Recent Labs (last 4 " months):   No visits with results within 4 Month(s) from this visit.   Latest known visit with results is:   No results found for any previous visit.       Suicide/Homicide Risk Assessment:    Risk of Harm to Self:  The following ratings are based on assessment at the time of the interview  Historical Risk Factors include: chronic psychiatric problems  Protective Factors: no current suicidal ideation, access to mental health treatment, being a parent, compliant with medications, compliant with mental health treatment, responsibilities and duties to others    Risk of Harm to Others:  The following ratings are based on assessment at the time of the interview  Historical Risk Factors include: none.  Protective Factors: no current homicidal ideation, being a parent, compliant with medications, compliant with mental health treatment, responsibilities and duties to others    The following interventions are recommended: contracts for safety at present - agrees to go to ED if feeling unsafe, contracts for safety at present - agrees to call Crisis Intervention Service if feeling unsafe      Lethality Statement:    Based on today's assessment and clinical criteria, Shani Gallegos contracts for safety and is not an imminent risk of harm to self or others. Outpatient level of care is deemed appropriate at this current time. Shani understands that if they can no longer contract for safety, they need to call the office or report to their nearest Emergency Room for immediate evaluation. They voiced understanding and agreement to call 911 or head to the nearest ED should they have any physical or mental decompensation whatsoever.       Assessment/Plan:     1.)  ADHD  2.)  Paranoid personality disorder    After discussion of risks, benefits, and side effects, alternatives, we will discontinue fluoxetine at patient request.  Adderall 30 mg twice daily will be continued.  Though patient would benefit for potentially an  antipsychotic for her paranoid thoughts along with an antidepressant/antianxiety medication, she does not feel as if she requires any other medications at this time, stating that she feels fully functional without any acute mental complaints or concerns.        Medications Risks/Benefits      Risks, Benefits And Possible Side Effects Of Medications:    Risks, benefits, and possible side effects of medications explained to Shani and she verbalizes understanding and agreement for treatment.    Controlled Medication Discussion:     Shani has been filling controlled prescriptions on time as prescribed according to [place] Prescription Drug Monitoring Program    Psychotherapy Provided:     Individual psychotherapy provided: Importance of medication and treatment compliance reviewed with Shani.  Educated on importance of medication and treatment compliance.  Importance of follow up with family physician for medical issues reviewed with Shani.     Treatment Plan:    Completed and signed during the session:  We will perform in next encounter due to lack of time today      Visit Time    Visit Start Time: 9:30 AM  Visit Stop Time: 9:40 AM  Total Visit Duration:  10 minutes     The total visit duration detailed above includes: patient engagement, medication management, psychotherapy/counseling, discussion regarding treatment goals, documentation, review of past medical records, and coordination of care.      Note Share Disclaimer:     This note was not shared with the patient due to reasonable likelihood of causing patient harm      Steven Baum DO  Psychiatry

## 2024-04-16 ENCOUNTER — OFFICE VISIT (OUTPATIENT)
Dept: OBGYN CLINIC | Facility: CLINIC | Age: 49
End: 2024-04-16
Payer: COMMERCIAL

## 2024-04-16 VITALS — WEIGHT: 180 LBS | DIASTOLIC BLOOD PRESSURE: 93 MMHG | HEART RATE: 112 BPM | SYSTOLIC BLOOD PRESSURE: 149 MMHG

## 2024-04-16 DIAGNOSIS — M25.561 KNEE MENISCUS PAIN, RIGHT: ICD-10-CM

## 2024-04-16 DIAGNOSIS — M22.2X1 PATELLOFEMORAL SYNDROME OF RIGHT KNEE: ICD-10-CM

## 2024-04-16 DIAGNOSIS — S83.206A POSITIVE MCMURRAY TEST OF RIGHT KNEE, INITIAL ENCOUNTER: ICD-10-CM

## 2024-04-16 DIAGNOSIS — M25.561 ACUTE PAIN OF RIGHT KNEE: Primary | ICD-10-CM

## 2024-04-16 DIAGNOSIS — M17.11 PRIMARY OSTEOARTHRITIS OF RIGHT KNEE: ICD-10-CM

## 2024-04-16 PROCEDURE — 99203 OFFICE O/P NEW LOW 30 MIN: CPT | Performed by: PHYSICIAN ASSISTANT

## 2024-04-16 NOTE — PROGRESS NOTES
Orthopaedic Surgery - Office Note  Shani Gallegos (48 y.o. female)   : 1975   MRN: [id number]  Encounter Date: 2024    Chief Complaint   Patient presents with    Right Knee - Pain         Assessment/Plan  Diagnoses and all orders for this visit:    Acute pain of right knee  -     Ambulatory Referral to Physical Therapy; Future  -     MRI knee right  wo contrast; Future  -     Ambulatory Referral to Orthopedic Surgery; Future    Primary osteoarthritis of right knee  -     Ambulatory Referral to Physical Therapy; Future  -     MRI knee right  wo contrast; Future  -     Ambulatory Referral to Orthopedic Surgery; Future    Patellofemoral syndrome of right knee  -     Ambulatory Referral to Orthopedic Surgery  -     Ambulatory Referral to Physical Therapy; Future  -     MRI knee right  wo contrast; Future  -     Ambulatory Referral to Orthopedic Surgery; Future    Knee meniscus pain, right  -     MRI knee right  wo contrast; Future  -     Ambulatory Referral to Orthopedic Surgery; Future    Positive Rubin test of right knee, initial encounter    The diagnosis as well as treatment options were reviewed with the patient in the office today.  She definitively has patellofemoral pain syndrome with the anterior knee discomfort that will benefit from physical therapy.  In addition she has mechanical symptoms with a positive medial Rubin's for which I am concerned about an acute medial meniscus tear that is symptomatic.  I would recommend an MRI to evaluate for a medial meniscus tear.  She will follow-up with an orthopedic surgeon to discuss the MRI result.  In light of the minimal degenerative changes on x-ray she may benefit from an arthroscopic partial medial meniscectomy pending results of the MRI.  She will ice the knee for comfort 20 minutes on 1 hour off 3 times a day.  She may use Tylenol as needed for pain.  Due to GI history she will refrain from oral NSAIDs.  I would not recommend any oral  narcotics for this condition.  All question concerns were answered in the office today     Return for Recheck with ortho surgeon to discuss MRI of the right knee.        History of Present Illness  This is a new patient with right knee pain.  Patient had primarily anterior knee discomfort consistent with patellofemoral syndrome and was seen at urgent care on 4/1/2024.  Patient was to start PT and Mobic at that time.  Patient also had rare medial knee pain as well.  The symptoms started in late March 2024.  There is a noted popping sensation from underneath the patella.  Patient reports she also has a sharp pain in the medial aspect of the knee.  She has noted intermittent swelling in the knee as well.  Twisting increases her medial knee pain.  She is starting to notice pain with every step.  She also has anterior knee pain with crepitus noted    Review of Systems  Pertinent items are noted in HPI.  All other systems were reviewed and are negative.    Physical Exam  /93   Pulse (!) 112   Wt 81.6 kg (180 lb)   Cons: Appears well.  No apparent distress.  Psych: Alert. Oriented x3.  Mood and affect normal.    On examination patient's right knee is tender on the medial lateral border of the patella with moderate retropatellar crepitus through full extension and flexion to 125 degrees.  She also has medial joint line tenderness with a positive medial Rubin's.  There is no pain or instability with valgus and varus stressing.  There is no instability to Lachman and posterior drawer.  She has no calf tenderness and a negative Homans.  She has a negative straight leg raise.  Her gait is heel-to-toe.  There is no evidence of DVT or infection.  Lateral joint line is nontender.  There is negative lateral Rubin's.  There is mild patella apprehension              Studies Reviewed  XR KNEE 3 VW RIGHT NON INJURY     INDICATION: M25.561: Pain in right knee.     COMPARISON: None     FINDINGS:     No acute fracture or  dislocation.     No significant joint effusion.     There is mild medial compartment joint space narrowing.     No lytic or blastic osseous lesion.     Unremarkable soft tissues.     IMPRESSION:     No acute osseous abnormality. Mild medial compartment joint space narrowing.           Workstation performed: [id number]  Independent review of x-rays by myself today in the office is in agreement with radiologist interpretation.  Urgent care notes were reviewed by myself in the office today.      Procedures  No procedures today.    Medical, Surgical, Family, and Social History  The patient's medical history, family history, and social history, were reviewed and updated as appropriate.    History reviewed. No pertinent past medical history.    History reviewed. No pertinent surgical history.    History reviewed. No pertinent family history.    Social History     Occupational History    Not on file   Tobacco Use    Smoking status: Some Days     Types: Cigarettes    Smokeless tobacco: Never   Substance and Sexual Activity    Alcohol use: Not on file    Drug use: Not on file    Sexual activity: Not on file       No Known Allergies      Current Outpatient Medications:     amphetamine-dextroamphetamine (ADDERALL) 30 MG tablet, Take 1 PO BID, Disp: 60 tablet, Rfl: 0    amphetamine-dextroamphetamine (ADDERALL) 30 MG tablet, TAKE 1 TABLET BY MOUTH TWICE A DAY (Patient not taking: Reported on 4/16/2024), Disp: 60 tablet, Rfl: 0    amphetamine-dextroamphetamine (ADDERALL, 30MG,) 30 MG tablet, Take 1 PO BID Do not start before December 5, 2023. (Patient not taking: Reported on 4/16/2024), Disp: 60 tablet, Rfl: 0    amphetamine-dextroamphetamine (ADDERALL, 30MG,) 30 MG tablet, Take 1 PO BID (Patient not taking: Reported on 4/16/2024), Disp: 60 tablet, Rfl: 0    [START ON 5/13/2024] amphetamine-dextroamphetamine (ADDERALL, 30MG,) 30 MG tablet, Take 1 PO BID Do not start before May 13, 2024. (Patient not taking: Reported on  4/16/2024 Do not start before May 13, 2024.), Disp: 60 tablet, Rfl: 0    [START ON 6/10/2024] amphetamine-dextroamphetamine (ADDERALL, 30MG,) 30 MG tablet, Take 1 PO BID Do not start before Pita 10, 2024. (Patient not taking: Reported on 4/16/2024 Do not start before Pita 10, 2024.), Disp: 60 tablet, Rfl: 0    FLUoxetine (PROzac) 20 mg capsule, Take 1 capsule (20 mg total) by mouth daily, Disp: 30 capsule, Rfl: 2    meloxicam (Mobic) 15 mg tablet, Take 1 tablet (15 mg total) by mouth daily (Patient not taking: Reported on 4/16/2024), Disp: 15 tablet, Rfl: 0      Ron Brewster PA-C

## 2024-04-16 NOTE — PATIENT INSTRUCTIONS
Meniscus Tear   WHAT YOU NEED TO KNOW:   What is a meniscus tear?  A meniscus tear is a tear in the cartilage of your knee. The meniscus is a piece of cartilage (strong tissue) between your thighbone and shinbone. The meniscus helps to cushion your knee joint and keep it stable.  What causes a meniscus tear?  A meniscus tear can occur if you twist your knee. A meniscus tear can happen during sports that involve squatting and twisting the knee, such as football or basketball. Weak and thinned meniscus cartilage in older people can increase the risk for a meniscus tear.   What are the signs and symptoms of a meniscus tear?   A pop or tear when the injury happens    Pain and swelling    Tenderness    Stiffness    Popping, catching, or locking of your knee    Not being able to extend your knee fully    How is a meniscus tear diagnosed?  Your healthcare provider will examine your knee. Your provider may bend your knee and then straighten and rotate it. Your provider may also order x-rays and an MRI of your knee. An MRI takes pictures of your knee to show the meniscus tear. You may be given contrast liquid to help the tear show up better. Tell the healthcare provider if you have ever had an allergic reaction to contrast liquid. Do not enter the MRI room with anything metal. Metal can cause serious injury. Tell the healthcare provider if you have any metal in or on your body.  How is a meniscus tear treated?  Treatment depends on the type of tear you have. Some types of meniscus tears can heal on their own. You may need any of the following:  NSAIDs , such as ibuprofen, help decrease swelling, pain, and fever. This medicine is available with or without a doctor's order. NSAIDs can cause stomach bleeding or kidney problems in certain people. If you take blood thinner medicine, always ask if NSAIDs are safe for you. Always read the medicine label and follow directions. Do not give these medicines to children younger than 6  months without direction from a healthcare provider.     Rest  your knee. Avoid activities that make the swelling or pain worse. You may need to avoid putting weight on your leg while you have pain. Your healthcare provider may recommend that you use crutches.    Apply ice  on your knee for 15 to 20 minutes every hour or as directed. Use an ice pack, or put crushed ice in a plastic bag. Cover it with a towel. Ice helps prevent tissue damage and decreases swelling and pain.    Compress  your knee with an elastic bandage, air cast, medical boot, or splint to reduce swelling. Ask your healthcare provider which compression device to use, and how tight it should be.    Elevate  your knee above the level of your heart as often as you can. This will help decrease swelling and pain. Prop your knee on pillows or blankets to keep it elevated comfortably.     Surgery  may be needed if your symptoms do not improve. Your healthcare provider may trim away or repair damaged tissue.    Call your local emergency number (911 in the [place]) if:   Your arm or leg feels warm, tender, and painful. It may look swollen and red.      When should I seek immediate care?   You cannot move your knee at all.       When should I call my doctor?   Your symptoms do not improve with treatment.    You have questions or concerns about your condition or care.    CARE AGREEMENT:   You have the right to help plan your care. Learn about your health condition and how it may be treated. Discuss treatment options with your healthcare providers to decide what care you want to receive. You always have the right to refuse treatment. The above information is an  only. It is not intended as medical advice for individual conditions or treatments. Talk to your doctor, nurse or pharmacist before following any medical regimen to see if it is safe and effective for you.  © Copyright [employer] 2023 Information is for End User's use only and may not be sold,  redistributed or otherwise used for commercial purposes.  Patellofemoral Pain Syndrome   WHAT YOU NEED TO KNOW:   Patellofemoral pain syndrome (PFPS) is pain in or around your patella (kneecap). PFPS is also called runner's knee or jumper's knee and is common in athletes. Rest, ice, and elevate your knee. You may need tape or brace to support your kneecap. Wear shoe inserts as directed and go to physical therapy.  DISCHARGE INSTRUCTIONS:   Call your doctor if:   You have trouble walking.    You have a fever.    Your knee brace or sleeve is too tight.    Your symptoms are not getting better, or they get worse.    Your pain and swelling increase even after you take pain medicine.    You have questions or concerns about your condition or care.    Manage your symptoms:       Change your activity.  You may need to rest your knee. You may need to change your exercise routine to low-impact activities.    Apply ice  on your knee for 15 to 20 minutes every hour or as directed. Use an ice pack, or put crushed ice in a plastic bag. Cover it with a towel before you apply it. Ice helps prevent tissue damage and decreases swelling and pain.    Elevate  your knee above the level of your heart as often as you can. This will help decrease swelling and pain. Prop your leg on pillows or blankets to keep it elevated comfortably. Do not  put a pillow directly under your knee.         Support  your knee by wrapping it with tape or an elastic bandage. You may need a brace for more support. This will help decrease swelling and keep your kneecap in the correct spot.    Wear shoe inserts as directed.  Orthotics or arch supports help keep your foot and ankle stable and in line to decrease stress on your knee.    Go to physical therapy as directed.  A physical therapist will teach you exercises to help improve movement and strength, and to decrease pain.    Maintain a healthy weight.  Ask your healthcare provider what a healthy weight is for  you. Ask him or her to help you create a weight loss plan if you are overweight. Weight loss can help decrease pressure on your knee.  Medicines:  You may need the following:  Acetaminophen  decreases pain and fever. It is available without a doctor's order. Ask how much to take and how often to take it. Follow directions. Read the labels of all other medicines you are using to see if they also contain acetaminophen, or ask your doctor or pharmacist. Acetaminophen can cause liver damage if not taken correctly.    NSAIDs , such as ibuprofen, help decrease swelling, pain, and fever. This medicine is available with or without a doctor's order. NSAIDs can cause stomach bleeding or kidney problems in certain people. If you take blood thinner medicine, always ask your healthcare provider if NSAIDs are safe for you. Always read the medicine label and follow directions.    Take your medicine as directed.  Contact your healthcare provider if you think your medicine is not helping or if you have side effects. Tell your provider if you are allergic to any medicine. Keep a list of the medicines, vitamins, and herbs you take. Include the amounts, and when and why you take them. Bring the list or the pill bottles to follow-up visits. Carry your medicine list with you in case of an emergency.    Prevent another episode:   Wear the right shoes for your activities.  Increase activity gradually.    Warm up before you exercise.  Stretch your leg muscles before and after activity.    Follow up with your doctor as directed:  You may be referred to an orthopedic surgeon. Write down your questions so you remember to ask them during your visits.  © Copyright [employer] 2023 Information is for End User's use only and may not be sold, redistributed or otherwise used for commercial purposes.  The above information is an  only. It is not intended as medical advice for individual conditions or treatments. Talk to your doctor, nurse  or pharmacist before following any medical regimen to see if it is safe and effective for you.

## 2024-04-18 ENCOUNTER — TELEPHONE (OUTPATIENT)
Dept: BEHAVIORAL/MENTAL HEALTH CLINIC | Facility: CLINIC | Age: 49
End: 2024-04-18

## 2024-04-18 ENCOUNTER — TELEPHONE (OUTPATIENT)
Dept: PSYCHIATRY | Facility: CLINIC | Age: 49
End: 2024-04-18

## 2024-04-18 ENCOUNTER — EVALUATION (OUTPATIENT)
Dept: PHYSICAL THERAPY | Facility: CLINIC | Age: 49
End: 2024-04-18
Payer: COMMERCIAL

## 2024-04-18 DIAGNOSIS — M17.11 PRIMARY OSTEOARTHRITIS OF RIGHT KNEE: ICD-10-CM

## 2024-04-18 DIAGNOSIS — M22.2X1 PATELLOFEMORAL SYNDROME OF RIGHT KNEE: ICD-10-CM

## 2024-04-18 DIAGNOSIS — M25.561 ACUTE PAIN OF RIGHT KNEE: Primary | ICD-10-CM

## 2024-04-18 PROCEDURE — 97162 PT EVAL MOD COMPLEX 30 MIN: CPT | Performed by: PHYSICAL THERAPIST

## 2024-04-18 NOTE — PROGRESS NOTES
PT Evaluation     Today's date: 2024  Patient name: Shani Gallegos  : 1975  MRN: [id number]  Referring provider: Ron Brewster PA*  Dx:   Encounter Diagnosis     ICD-10-CM    1. Acute pain of right knee  M25.561 Ambulatory Referral to Physical Therapy      2. Primary osteoarthritis of right knee  M17.11 Ambulatory Referral to Physical Therapy      3. Patellofemoral syndrome of right knee  M22.2X1 Ambulatory Referral to Physical Therapy                     Assessment  Assessment details: Pt is a 48 y.o. year old female coming to outpatient PT with a diagnosis of R knee pain. Pt presents with increased pain and TTP, decreased ROM, decreased strength, and overall decreased functional mobility. Pt would benefit from skilled PT services in order to address these deficits and reach maximum level of function. Thank you kindly for the referral!    Pt was issued a written HEP to be performed on a daily basis.     Pt arrived 10' late to apt.  Impairments: abnormal gait, activity intolerance, impaired physical strength, lacks appropriate home exercise program, pain with function and weight-bearing intolerance  Understanding of Dx/Px/POC: good   Prognosis: good    Goals  STG's ( 3-4 weeks)  1. Pt will be independent in HEP  2. Pt will have less pain with sleeping activities  LTG's ( 6- 8 weeks)  1. Improve FOTO score by 4-6 points  2. Pt will have decreased pain to 2/10 at worst  3. Pt will have less pain with walking community distances  4. Pt will go up and down the steps with decreased pain  5. Pt will have improved tolerance for standing so that pt can RTW as a .    Plan  Patient would benefit from: PT eval and skilled physical therapy  Planned modality interventions: cryotherapy  Planned therapy interventions: manual therapy, neuromuscular re-education, strengthening, stretching, therapeutic activities, therapeutic exercise, functional ROM exercises, flexibility and home exercise  program  Frequency: 2x week  Duration in weeks: 6  Plan of Care beginning date: 2024  Plan of Care expiration date: 2024  Treatment plan discussed with: PTA and patient        Subjective Evaluation    History of Present Illness  Mechanism of injury: Pt reports R knee pain onset 2024 possibly due to kneeling on the floor and felt a sharp pain in her patellar region. Pt started to have increased pain with walking, standing, going up and down the steps, and lifting her leg to get in and out of the car. Pt feels like she has a heavy leg. Pt has increased pain in the morning and has increased pain when standing up after prolonged sitting activities. Pt has pain at night when sleeping. Pt has hernias in her stomach with mesh. Pt has prior back and neck injuries with disc herniations. Pt also needed to wear braces as a child 2* legs were turned inward. Pt went to Urgent Care to make sure she did not have a blood clot.     Hobbies: playing [employer]  Work; ; not currently working  Gait; mild antalgic, decreased WB on R LE  Patient Goals  Patient goals for therapy: decreased pain, independence with ADLs/IADLs and return to work  Patient goal: to be able to move again; to be able to put pressure on my knee  Pain  At best pain ratin  At worst pain ratin  Location: R knee: aching, heavy  Quality: dull ache    Social Support  Steps to enter house: yes (5)  Stairs in house: yes (1)     Employment status: not working    Diagnostic Tests  X-ray: normal  Treatments  No previous or current treatments        Objective     Neurological Testing     Sensation     Hip   Left Hip   Intact: light touch    Right Hip   Intact: light touch    Knee   Left Knee   Intact: Light touch    Right Knee   Intact: light touch     Reflexes   Left   Patellar (L4): normal (2+)  Achilles (S1): normal (2+)    Right   Patellar (L4): normal (2+)  Achilles (S1): normal (2+)    Active Range of Motion   Left Knee   Flexion: 125  "degrees   Extension: 0 degrees     Right Knee   Flexion: 120 degrees   Extension: 0 degrees     Additional Active Range of Motion Details  (+) TTP R medial knee  Mild edema upon visual inspection  (-) ligamentous testing  (-) Rubin's test  (-) Thessaly test  HS flexibility; R: 75*  L: 80* with opposite knee flexed    Strength/Myotome Testing     Left Hip   Planes of Motion   Flexion: 4+  Extension: 4-  Abduction: 4+  Adduction: 4+  External rotation: 4+    Right Hip   Planes of Motion   Flexion: 4  Extension: 3  Abduction: 4+  Adduction: 4+  External rotation: 4+      Dx: acute R knee pain  EPOC:  5/30/24  CO-MORBIDITIES: lumbar disc HNP, abdominal hernia with mesh  PERSONAL FACTORS:   Precautions: none      Manuals 4/18            Knee MFR/ PROM             Knee joint mobs             K tape 2 I cuts for support 1'                         Neuro Re-Ed             sidestepping             Tandem walking on foam             SLS             bridges             Standing TKE w/ TB                                       Ther Ex             Bike for LE ROM             Gastroc stretch slt bd             Stand HR slt bd             LAQ             Quad sets             SLR: toe straight             S/l hip abduction             Leg press              HEP instruct 5'                         Ther Activity             Step ups: FW 4\"            Step ups: lat 4\"            Gait Training                                       Modalities             Cp post tx                             "

## 2024-04-19 ENCOUNTER — OFFICE VISIT (OUTPATIENT)
Dept: PHYSICAL THERAPY | Facility: CLINIC | Age: 49
End: 2024-04-19
Payer: COMMERCIAL

## 2024-04-19 DIAGNOSIS — M17.11 PRIMARY OSTEOARTHRITIS OF RIGHT KNEE: ICD-10-CM

## 2024-04-19 DIAGNOSIS — M25.561 ACUTE PAIN OF RIGHT KNEE: Primary | ICD-10-CM

## 2024-04-19 DIAGNOSIS — M22.2X1 PATELLOFEMORAL SYNDROME OF RIGHT KNEE: ICD-10-CM

## 2024-04-19 PROCEDURE — 97110 THERAPEUTIC EXERCISES: CPT

## 2024-04-19 NOTE — PROGRESS NOTES
"Daily Note     Today's date: 2024  Patient name: Shani Gallegos  : 1975  MRN: [id number]  Referring provider: Ron Brewster PA*  Dx:   Encounter Diagnosis     ICD-10-CM    1. Acute pain of right knee  M25.561       2. Primary osteoarthritis of right knee  M17.11       3. Patellofemoral syndrome of right knee  M22.2X1                      Subjective: Pt states feeling ok.       Objective: See treatment diary below      Assessment: Tolerated treatment well. Patient exhibited good technique with therapeutic exercises      Plan: Continue per plan of care.      Dx: acute R knee pain  EPOC:  24  CO-MORBIDITIES: lumbar disc HNP, abdominal hernia with mesh  PERSONAL FACTORS:   Precautions: none      Manuals            Knee MFR/ PROM             Knee joint mobs             K tape 2 I cuts for support 1' Def  D/c per pt.                        Neuro Re-Ed             sidestepping             Tandem walking on foam             SLS             bridges  20x           Standing TKE w/ TB                                       Ther Ex             Bike for LE ROM  D/c p!           TM   2.0mph 5'           Gastroc stretch slt bd              Stand HR slt bd             LAQ  5\" 20           Quad sets             SLR: toe straight  20x           S/l hip abduction  20x           Leg press B  95# 20           Clamshell   20x           HEP instruct 5'                         Ther Activity             Step ups: FW 4\"            Step ups: lat 4\"            Gait Training                                       Modalities             Cp post tx                                "

## 2024-04-25 ENCOUNTER — TELEPHONE (OUTPATIENT)
Dept: BEHAVIORAL/MENTAL HEALTH CLINIC | Facility: CLINIC | Age: 49
End: 2024-04-25

## 2024-04-25 ENCOUNTER — OFFICE VISIT (OUTPATIENT)
Dept: PHYSICAL THERAPY | Facility: CLINIC | Age: 49
End: 2024-04-25
Payer: COMMERCIAL

## 2024-04-25 DIAGNOSIS — M17.11 PRIMARY OSTEOARTHRITIS OF RIGHT KNEE: ICD-10-CM

## 2024-04-25 DIAGNOSIS — M22.2X1 PATELLOFEMORAL SYNDROME OF RIGHT KNEE: ICD-10-CM

## 2024-04-25 DIAGNOSIS — M25.561 ACUTE PAIN OF RIGHT KNEE: Primary | ICD-10-CM

## 2024-04-25 PROCEDURE — 97110 THERAPEUTIC EXERCISES: CPT | Performed by: PHYSICAL THERAPIST

## 2024-04-25 NOTE — PROGRESS NOTES
"Daily Note     Today's date: 2024  Patient name: Shani Gallegos  : 1975  MRN: [id number]  Referring provider: Ron Brewster PA*  Dx:   Encounter Diagnosis     ICD-10-CM    1. Acute pain of right knee  M25.561       2. Primary osteoarthritis of right knee  M17.11       3. Patellofemoral syndrome of right knee  M22.2X1                      Subjective:  Pt arrived 15 minutes late to her apt. Pt reports she has increased clicking in her knee with sharp pain.       Objective: See treatment diary below      Assessment: Tolerated treatment fair. Patient exhibited good technique with therapeutic exercises Pt was not able to sidestep with theraband 2* high pain levels      Plan: Continue per plan of care.      Dx: acute R knee pain  EPOC:  24  CO-MORBIDITIES: lumbar disc HNP, abdominal hernia with mesh  PERSONAL FACTORS:   Precautions: none  With PT/PTA 24 visit    Manuals           Knee MFR/ PROM             Knee joint mobs             K tape 2 I cuts for support 1' Def  D/c per pt.                        Neuro Re-Ed             sidestepping   2 laps          Tandem walking on foam   2 laps          SLS             bridges  20x 20          Standing TKE w/ TB   OTB 10x5\"                                    Ther Ex             Bike for LE ROM  D/c p!           TM   2.0mph 5' 5'           Gastroc stretch slt bd    1'          Stand HR slt bd   15          LAQ  5\" 20 5\"x20          Quad sets             SLR: toe straight  20x 20          S/l hip abduction  20x 20          Leg press B  95# 20 95# x20          Clamshell   20x 20          HEP instruct 5'            HS curl machine   25# x15                       Ther Activity             Step ups: FW 4\"            Step ups: lat 4\"            Gait Training                                       Modalities             Cp post tx                                  "

## 2024-05-01 ENCOUNTER — OFFICE VISIT (OUTPATIENT)
Dept: PHYSICAL THERAPY | Facility: CLINIC | Age: 49
End: 2024-05-01
Payer: COMMERCIAL

## 2024-05-01 DIAGNOSIS — M22.2X1 PATELLOFEMORAL SYNDROME OF RIGHT KNEE: ICD-10-CM

## 2024-05-01 DIAGNOSIS — M17.11 PRIMARY OSTEOARTHRITIS OF RIGHT KNEE: ICD-10-CM

## 2024-05-01 DIAGNOSIS — M25.561 ACUTE PAIN OF RIGHT KNEE: Primary | ICD-10-CM

## 2024-05-01 PROCEDURE — 97110 THERAPEUTIC EXERCISES: CPT

## 2024-05-01 NOTE — PROGRESS NOTES
"Daily Note     Today's date: 2024  Patient name: Shani Gallegos  : 1975  MRN: [id number]  Referring provider: Ron Brewster PA*  Dx:   Encounter Diagnosis     ICD-10-CM    1. Acute pain of right knee  M25.561       2. Primary osteoarthritis of right knee  M17.11       3. Patellofemoral syndrome of right knee  M22.2X1             Start Time: 1222  Stop Time: 1300  Total time in clinic (min): 38 minutes    Subjective: Patient reports minimal changes/improvements with R knee after last session, but did note that her R LE tightness/stiffness seems to \"loosen up and feel more mobile\" when she is physically active and on her feet as compared to when she is sitting or laying down. Patient notes that her knee feels as though it is \"torqued\" and feels like something is \"off\" with the body mechanics of her patella, affecting R LE entirely. Patient has an MRI on Saturday to assess knee symptoms further.      Objective: See treatment diary below      Assessment: Tolerated treatment fair. Patient experienced off and on bouts of medial knee pain/discomfort throughout today's session which patient described as a \"sharp and burning\" sensation. Attempted sidestepping exercise again during today's session in order to determine if patient experienced any improvements in tolerance to exercise, but resulted in the same aggravation of symptoms experienced last session. Muscle fatigue present at the conclusion of session. Patient demonstrated fatigue post treatment and would benefit from continued PT to reduce symptoms, restore R knee mobility, stability, and strength, and to maximize patient's overall functional ability.      Plan: Continue per plan of care.      Dx: acute R knee pain  EPOC:  24  CO-MORBIDITIES: lumbar disc HNP, abdominal hernia with mesh  PERSONAL FACTORS:   Precautions: none  With PT/PTA 24 visit    Manuals          Knee MFR/ PROM             Knee joint mobs           " "  K tape 2 I cuts for support 1' Def  D/c per pt.                        Neuro Re-Ed             sidestepping   2 laps 1 lap P!         Tandem walking on foam   2 laps 2 laps         SLS             bridges  20x 20 20         Standing TKE w/ TB   OTB 10x5\" OTB 10x5\"                                   Ther Ex             Bike for LE ROM  D/c p!           TM   2.0mph 5' 5'  5'         Gastroc stretch slt bd    1' 1'         Stand HR slt bd   15 20         LAQ  5\" 20 5\"x20 5\"x20         Quad sets             SLR: toe straight  20x 20 20           S/l hip abduction  20x 20 20         Leg press B  95# 20 95# x20 95# x20         Clamshell   20x 20 20         HEP instruct 5'            HS curl machine   25# x15 25# x20                      Ther Activity             Step ups: FW 4\"            Step ups: lat 4\"            Gait Training                                       Modalities             Cp post tx                                  " done

## 2024-05-04 ENCOUNTER — HOSPITAL ENCOUNTER (OUTPATIENT)
Dept: MRI IMAGING | Facility: HOSPITAL | Age: 49
Discharge: HOME | End: 2024-05-04
Payer: COMMERCIAL

## 2024-05-04 DIAGNOSIS — M17.11 PRIMARY OSTEOARTHRITIS OF RIGHT KNEE: ICD-10-CM

## 2024-05-04 DIAGNOSIS — M22.2X1 PATELLOFEMORAL SYNDROME OF RIGHT KNEE: ICD-10-CM

## 2024-05-04 DIAGNOSIS — M25.561 ACUTE PAIN OF RIGHT KNEE: ICD-10-CM

## 2024-05-04 DIAGNOSIS — M25.561 KNEE MENISCUS PAIN, RIGHT: ICD-10-CM

## 2024-05-04 PROCEDURE — 73721 MRI JNT OF LWR EXTRE W/O DYE: CPT

## 2024-05-08 ENCOUNTER — APPOINTMENT (OUTPATIENT)
Dept: PHYSICAL THERAPY | Facility: CLINIC | Age: 49
End: 2024-05-08
Payer: COMMERCIAL

## 2024-05-09 ENCOUNTER — OFFICE VISIT (OUTPATIENT)
Dept: OBGYN CLINIC | Facility: CLINIC | Age: 49
End: 2024-05-09
Payer: COMMERCIAL

## 2024-05-09 VITALS
BODY MASS INDEX: 37.92 KG/M2 | SYSTOLIC BLOOD PRESSURE: 132 MMHG | WEIGHT: 214 LBS | HEIGHT: 63 IN | DIASTOLIC BLOOD PRESSURE: 84 MMHG

## 2024-05-09 DIAGNOSIS — M84.453A INSUFFICIENCY FRACTURE OF MEDIAL FEMORAL CONDYLE (HCC): Primary | ICD-10-CM

## 2024-05-09 DIAGNOSIS — M17.11 PRIMARY OSTEOARTHRITIS OF RIGHT KNEE: ICD-10-CM

## 2024-05-09 PROBLEM — S80.01XA CONTUSION OF RIGHT KNEE, INITIAL ENCOUNTER: Status: ACTIVE | Noted: 2024-05-09

## 2024-05-09 PROBLEM — S80.01XA CONTUSION OF RIGHT KNEE, INITIAL ENCOUNTER: Status: RESOLVED | Noted: 2024-05-09 | Resolved: 2024-05-09

## 2024-05-09 PROCEDURE — 99213 OFFICE O/P EST LOW 20 MIN: CPT | Performed by: ORTHOPAEDIC SURGERY
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
## 2024-05-13 ENCOUNTER — APPOINTMENT (OUTPATIENT)
Dept: PHYSICAL THERAPY | Facility: CLINIC | Age: 49
End: 2024-05-13
Payer: COMMERCIAL

## 2024-05-15 ENCOUNTER — APPOINTMENT (OUTPATIENT)
Dept: PHYSICAL THERAPY | Facility: CLINIC | Age: 49
End: 2024-05-15
Payer: COMMERCIAL

## 2024-05-20 ENCOUNTER — APPOINTMENT (OUTPATIENT)
Dept: PHYSICAL THERAPY | Facility: CLINIC | Age: 49
End: 2024-05-20
Payer: COMMERCIAL

## 2024-05-22 ENCOUNTER — APPOINTMENT (OUTPATIENT)
Dept: PHYSICAL THERAPY | Facility: CLINIC | Age: 49
End: 2024-05-22
Payer: COMMERCIAL

## 2024-05-28 ENCOUNTER — APPOINTMENT (OUTPATIENT)
Dept: PHYSICAL THERAPY | Facility: CLINIC | Age: 49
End: 2024-05-28
Payer: COMMERCIAL

## 2024-05-29 ENCOUNTER — APPOINTMENT (OUTPATIENT)
Dept: PHYSICAL THERAPY | Facility: CLINIC | Age: 49
End: 2024-05-29
Payer: COMMERCIAL

## 2024-05-30 ENCOUNTER — APPOINTMENT (OUTPATIENT)
Dept: PHYSICAL THERAPY | Facility: CLINIC | Age: 49
End: 2024-05-30
Payer: COMMERCIAL

## 2024-07-30 DIAGNOSIS — F90.2 ATTENTION DEFICIT HYPERACTIVITY DISORDER, COMBINED TYPE: ICD-10-CM

## 2024-07-30 RX ORDER — DEXTROAMPHETAMINE SACCHARATE, AMPHETAMINE ASPARTATE, DEXTROAMPHETAMINE SULFATE AND AMPHETAMINE SULFATE 7.5; 7.5; 7.5; 7.5 MG/1; MG/1; MG/1; MG/1
TABLET ORAL
Qty: 60 TABLET | Refills: 0 | Status: SHIPPED | OUTPATIENT
Start: 2024-07-30

## 2024-07-30 NOTE — TELEPHONE ENCOUNTER
Unfortunately, patient has suffered from paranoid delusions which have consistently sabotaged her job prospects. My impressions about our appointments were positive however I wish her the best with her BRIDGET.

## 2024-08-07 ENCOUNTER — TELEPHONE (OUTPATIENT)
Dept: PSYCHIATRY | Facility: CLINIC | Age: 49
End: 2024-08-07

## 2024-08-28 DIAGNOSIS — F90.2 ATTENTION DEFICIT HYPERACTIVITY DISORDER, COMBINED TYPE: ICD-10-CM

## 2024-08-28 RX ORDER — DEXTROAMPHETAMINE SACCHARATE, AMPHETAMINE ASPARTATE, DEXTROAMPHETAMINE SULFATE AND AMPHETAMINE SULFATE 7.5; 7.5; 7.5; 7.5 MG/1; MG/1; MG/1; MG/1
TABLET ORAL
Qty: 60 TABLET | Refills: 0 | Status: SHIPPED | OUTPATIENT
Start: 2024-08-28

## 2024-08-28 NOTE — TELEPHONE ENCOUNTER
Medication Refill Request     Name of Medication Adderall   Dose/Frequency 30mg  Quantity 60  Verified pharmacy   [x]  Verified ordering Provider   [x]  Does patient have enough for the next 3 days? Yes [x] No []  Does patient have a follow-up appointment scheduled? Yes [] No [x]   If so when is appointment: n/a

## 2024-08-28 NOTE — TELEPHONE ENCOUNTER
This patient is no longer under my care. They requested a transfer. I will approve this medication this one time but I expect that the next refill will be done by the new provider.

## 2024-09-03 ENCOUNTER — TELEPHONE (OUTPATIENT)
Dept: PSYCHIATRY | Facility: CLINIC | Age: 49
End: 2024-09-03

## 2024-09-11 ENCOUNTER — TELEPHONE (OUTPATIENT)
Dept: BEHAVIORAL/MENTAL HEALTH CLINIC | Facility: CLINIC | Age: 49
End: 2024-09-11

## 2024-09-11 NOTE — TELEPHONE ENCOUNTER
LVM: Contacted client due to MA ending on 8/31/24. Caller asked client to call back with updated insurance information or if they would like to pay privately. Caller informed client of deposit, estimate, and acknowledgement.

## 2024-09-17 ENCOUNTER — TELEPHONE (OUTPATIENT)
Dept: PSYCHIATRY | Facility: CLINIC | Age: 49
End: 2024-09-17

## 2025-03-06 ENCOUNTER — TELEPHONE (OUTPATIENT)
Age: 50
End: 2025-03-06

## 2025-03-06 NOTE — TELEPHONE ENCOUNTER
Pt called back. Writer let pt know that her ins is no accepted and  she would have a self pay copay of $30. Pt is fine with that writer also gave pt further financial assistance number [phone].  transferred her over to [facility] department

## 2025-03-06 NOTE — TELEPHONE ENCOUNTER
Patient has new insurance through [employer]. It is [employer].  ID [id number]  Group [id number]  Writer informed that we will have to check on this insurance and call her back. Pt agreed.    Writer contacted Supervisor and we do not accept this insurance.Writer called pt and left VM. Writer informed that she could do a self pay with $30 copay and if need Financial assistance to call us back.

## 2025-03-11 PROBLEM — E04.1 NONTOXIC UNINODULAR GOITER: Status: ACTIVE | Noted: 2025-03-11

## 2025-03-11 PROBLEM — I10 HYPERTENSION: Chronic | Status: ACTIVE | Noted: 2021-03-19

## 2025-03-11 PROBLEM — K42.9 UMBILICAL HERNIA WITHOUT OBSTRUCTION AND WITHOUT GANGRENE: Status: RESOLVED | Noted: 2021-08-30 | Resolved: 2025-03-11

## 2025-03-11 PROBLEM — E66.01 MORBID (SEVERE) OBESITY DUE TO EXCESS CALORIES (HCC): Chronic | Status: ACTIVE | Noted: 2021-03-19

## 2025-03-11 PROBLEM — K76.0 FATTY LIVER: Status: ACTIVE | Noted: 2025-03-11

## 2025-03-11 PROBLEM — G47.33 OBSTRUCTIVE SLEEP APNEA: Status: ACTIVE | Noted: 2021-03-19

## 2025-03-11 PROBLEM — K74.60 HEPATIC CIRRHOSIS (HCC): Status: ACTIVE | Noted: 2025-03-11

## 2025-03-11 PROBLEM — F17.200 TOBACCO USE DISORDER: Status: ACTIVE | Noted: 2025-03-11

## 2025-03-11 PROBLEM — K42.9 UMBILICAL HERNIA WITHOUT OBSTRUCTION AND WITHOUT GANGRENE: Status: ACTIVE | Noted: 2021-08-30

## 2025-03-11 RX ORDER — OXYCODONE HYDROCHLORIDE 5 MG/1
5 TABLET ORAL EVERY 8 HOURS PRN
COMMUNITY
Start: 2025-01-19 | End: 2025-03-12

## 2025-03-11 RX ORDER — METHOCARBAMOL 750 MG/1
750 TABLET, FILM COATED ORAL EVERY 6 HOURS PRN
COMMUNITY
Start: 2025-03-05 | End: 2025-03-12

## 2025-03-11 NOTE — PROGRESS NOTES
Name: Shani Gallegos      : 1975      MRN: [id number]  Encounter Provider: Chelsey Ojeda DO  Encounter Date: 3/12/2025   Encounter department: [facility] PRIMARY CARE    Assessment & Plan  Type 2 diabetes mellitus without complication, without long-term current use of insulin (HCC)    Lab Results   Component Value Date    HGBA1C 6.7 (A) 2025   Did A1c in office today due to some recent elevated glucose levels she had while at [facility] earlier this year.   A1c elevated at 6.7 consistent with diabetes   Discussed importance of diet and exercise  She declines referral to diabetic education right now as mother was diabetic and she knows what she should and should be eating.   Check urine for microalbumin  Foot exam done.   Will start her on metformin  mg daily and also for mounjaro 2.5 mg weekly which should help with her weight, fatty liver and CHRIS as well.   Check cmp and lipids  Rx for dexcom though not sure this will be covered  Return in 1 month for recheck of logs and bp    Orders:  •  Tirzepatide (Mounjaro) 2.5 MG/0.5ML SOAJ; Inject 2.5 mg under the skin once a week  •  metFORMIN (GLUCOPHAGE-XR) 500 mg 24 hr tablet; Take 1 tablet (500 mg total) by mouth daily with dinner  •  Continuous Glucose Sensor (Dexcom G7 Sensor); Use 1 Device every 10 days  •  Continuous Glucose  (Dexcom G7 ) SHANNA; Use 1 each once for 1 dose  •  Comprehensive metabolic panel  •  Lipid panel  •  Albumin / creatinine urine ratio    Fatty liver  Seen on US done at [facility] on 25   She follows with gi at [facility]       Cirrhosis of liver without ascites, unspecified hepatic cirrhosis type (HCC)  Seeing GI at [facility]. Scheduled for EGD next week.        Umbilical hernia without obstruction and without gangrene  S.p hernia repair       Encounter for screening mammogram for malignant neoplasm of breast    Orders:  •  Mammo screening bilateral w cad; Future    Hyperglycemia    Orders:  •  POCT  hemoglobin A1c    Morbid (severe) obesity due to excess calories (HCC)           Cigarette nicotine dependence in remission         Colon cancer screening    Orders:  •  Ambulatory Referral to Gastroenterology; Future    Hypertension, unspecified type  Previously on medication for this. Not sure what med she was on  Off meds since 2021. Just ran out.   Has been checking bp in ambulatory setting. Readings elevated.   153/97 last night.   Recommend she resume taking medication.   Rx for valsartan 80 to pharmacy    Orders:  •  valsartan (DIOVAN) 80 mg tablet; Take 1 tablet (80 mg total) by mouth daily    Migraine with aura and without status migrainosus, not intractable  Previously on prophylaxis with topamax. Caused memory issues and hair loss  Was also on imitrex  Currently without meds  5 headaches per month.   Needs rx for imitrex. Rx sent    Orders:  •  SUMAtriptan (IMITREX) 50 mg tablet; Take 1 tablet (50 mg total) by mouth once as needed for migraine for up to 1 dose may repeat in 2 hours if necessary    Obstructive sleep apnea  Sleep study in July of 2020 at [place] showed  This home sleep apnea test confirms a diagnosis of obstructive sleep apnea   (CHRIS). The overall severity was in the mild range [normal AHI range 0-5;   mild range 5-15; moderate range 15-30; severe range > 30]. The AHI may   have been underestimated since total sleep time is not known.   Therapeutic options to treat her sleep disordered breathing include auto   CPAP, oral appliance or positional therapy. In addition, the patient   should be encouraged to lose weight. Follow up will be with the [facility]      Currently not on cpap  Wants to resume  Refer to sleep medicine      Orders:  •  Ambulatory Referral to Sleep Medicine; Future    Attention deficit hyperactivity disorder, combined type  Follows with psychiatry, currently seeing Dr Baum but changing to someone new          Screening for thyroid  disorder    Orders:  •  TSH, 3rd generation with Free T4 reflex         History of Present Illness     HPI  Patient is a 49 year old female with multiple chronic medical problems as noted below who is being seen today as a new patient to this office to establish care.     Previous PCP=Dionicio Daniel MD at [facility]. In review of records in care everywhere, appears that last visit with this provider was in 2021.     Sees gyn for her paps. Will obtain copy of last result. Sees [facility] in [place].   She is due for mammogram. Last one at [facility] in January of 2024.   Hs not had colon cancer screening done. Is agreeable to having colonoscopy  She had Td in 2022.   Did not get flu vaccine this season.     Sees psych for her ADHD, anxiety, PTSD. In review of PDMP, adderall 30 mg  rx by a Steven Baum MD in [place]. Last filled on 8/28/24 for #60 tablets    Had some imaging of liver done in January of this year by community health doctor at [facility]. Showed fatty liver and hepatic cirrhosis.   Scheduled with GI (Dr. Mcguire) at [facility] next week for EGD.     Underwent ventral hernia repair in January of this year with Dr Goldman at [facility].     Review of Systems  Past Medical History:   Diagnosis Date   • Cirrhosis (HCC)    • Fatty liver    • GERD (gastroesophageal reflux disease)    • Hypertension    • Hypoglycemia    • Insufficiency fracture of medial femoral condyle (HCC) 05/09/2024   • Low back pain    • Migraines    • Neck pain    • Umbilical hernia without obstruction and without gangrene 08/30/2021    CT Abdomen and pelvis 1/18/25 done for complaint of abdominal pain showed incidental finding of   Enlarging fat-containing paraumbilical hernia with mild inflammatory   stranding of the herniated fat   Underwent surgery for incarcerated ventral hernia repair with dr goldman on 1/19/25       Past Surgical History:   Procedure Laterality Date   • ABDOMINAL SURGERY  11/10/2015    LIPECTOMY WITH TRANSLOCATION UMBILICUS INCISIONAL  "HERNIA REPAIR WITH MESH by Dr. Behman and Dr. Bray on 11/10   •  SECTION     • LIPECTOMY  11/10/2015    dr pickard   • VENTRAL HERNIA REPAIR  2025    dr pickard at [facility]     Family History   Problem Relation Age of Onset   • Lung cancer Mother    • Diabetes Mother    • Clotting disorder Father    • Pulmonary embolism Father    • Lung cancer Paternal Grandfather      Social History     Tobacco Use   • Smoking status: Former     Current packs/day: 0.00     Average packs/day: 0.1 packs/day for 20.0 years (2.0 ttl pk-yrs)     Types: Cigarettes     Start date: 2004     Quit date: 2024     Years since quittin.5     Passive exposure: Past   • Smokeless tobacco: Never   Vaping Use   • Vaping status: Former   Substance and Sexual Activity   • Alcohol use: Not Currently   • Drug use: Never   • Sexual activity: Not Currently     Current Outpatient Medications on File Prior to Visit   Medication Sig   • Multiple Vitamin (multivitamin) tablet Take 1 tablet by mouth daily   • [DISCONTINUED] methocarbamol (ROBAXIN) 750 mg tablet Take 750 mg by mouth every 6 (six) hours as needed for muscle spasms (Patient not taking: Reported on 3/12/2025)   • [DISCONTINUED] oxyCODONE (ROXICODONE) 5 immediate release tablet Take 5 mg by mouth every 8 (eight) hours as needed for severe pain (Patient not taking: Reported on 3/12/2025)     No Known Allergies  Immunization History   Administered Date(s) Administered   • COVID-19 PFIZER VACCINE 0.3 ML IM 2021, 2021   • Hep B, adult 2013, 2013, 2013   • INFLUENZA 2010, 10/22/2013, 2015, 2018   • Influenza, seasonal, injectable 10/22/2013   • Td (adult), adsorbed 2022   • Tdap 10/22/2013   • Tuberculin Skin Test-PPD Intradermal 2022     Objective   /77   Pulse 91   Ht 5' 2.25\" (1.581 m)   Wt 106 kg (234 lb)   SpO2 98%   BMI 42.46 kg/m²     Physical Exam  Vitals and nursing note reviewed.   Constitutional:       " General: She is not in acute distress.     Appearance: Normal appearance. She is obese. She is not ill-appearing, toxic-appearing or diaphoretic.   HENT:      Head: Normocephalic and atraumatic.      Right Ear: Tympanic membrane normal.      Left Ear: Tympanic membrane normal.      Mouth/Throat:      Mouth: Mucous membranes are moist.      Pharynx: No posterior oropharyngeal erythema.   Eyes:      Extraocular Movements: Extraocular movements intact.      Conjunctiva/sclera: Conjunctivae normal.      Pupils: Pupils are equal, round, and reactive to light.   Neck:      Vascular: No carotid bruit.   Cardiovascular:      Rate and Rhythm: Normal rate and regular rhythm.      Heart sounds: No murmur heard.  Pulmonary:      Effort: Pulmonary effort is normal.      Breath sounds: Normal breath sounds.   Abdominal:      General: Bowel sounds are normal. There is distension.      Palpations: Abdomen is soft. There is no mass.      Tenderness: There is no abdominal tenderness. There is no guarding.   Musculoskeletal:      Cervical back: Normal range of motion.      Right lower leg: Edema (trace bilateral lower extremity edema) present.      Left lower leg: Edema present.   Neurological:      General: No focal deficit present.      Mental Status: She is alert.   Psychiatric:         Mood and Affect: Mood normal.

## 2025-03-11 NOTE — ASSESSMENT & PLAN NOTE
Seen on US done at [facility] on 1/20/25   She follows with gi at [facility]

## 2025-03-12 ENCOUNTER — OFFICE VISIT (OUTPATIENT)
Dept: FAMILY MEDICINE CLINIC | Facility: CLINIC | Age: 50
End: 2025-03-12
Payer: COMMERCIAL

## 2025-03-12 ENCOUNTER — TELEPHONE (OUTPATIENT)
Age: 50
End: 2025-03-12

## 2025-03-12 ENCOUNTER — TELEPHONE (OUTPATIENT)
Dept: ADMINISTRATIVE | Facility: OTHER | Age: 50
End: 2025-03-12

## 2025-03-12 VITALS
DIASTOLIC BLOOD PRESSURE: 77 MMHG | BODY MASS INDEX: 43.06 KG/M2 | OXYGEN SATURATION: 98 % | HEIGHT: 62 IN | SYSTOLIC BLOOD PRESSURE: 145 MMHG | HEART RATE: 91 BPM | WEIGHT: 234 LBS

## 2025-03-12 DIAGNOSIS — R73.9 HYPERGLYCEMIA: ICD-10-CM

## 2025-03-12 DIAGNOSIS — K74.60 CIRRHOSIS OF LIVER WITHOUT ASCITES, UNSPECIFIED HEPATIC CIRRHOSIS TYPE (HCC): ICD-10-CM

## 2025-03-12 DIAGNOSIS — G47.33 OBSTRUCTIVE SLEEP APNEA: ICD-10-CM

## 2025-03-12 DIAGNOSIS — K76.0 FATTY LIVER: ICD-10-CM

## 2025-03-12 DIAGNOSIS — K42.9 UMBILICAL HERNIA WITHOUT OBSTRUCTION AND WITHOUT GANGRENE: ICD-10-CM

## 2025-03-12 DIAGNOSIS — Z12.31 ENCOUNTER FOR SCREENING MAMMOGRAM FOR MALIGNANT NEOPLASM OF BREAST: ICD-10-CM

## 2025-03-12 DIAGNOSIS — I10 HYPERTENSION, UNSPECIFIED TYPE: Chronic | ICD-10-CM

## 2025-03-12 DIAGNOSIS — F17.211 CIGARETTE NICOTINE DEPENDENCE IN REMISSION: ICD-10-CM

## 2025-03-12 DIAGNOSIS — F90.2 ATTENTION DEFICIT HYPERACTIVITY DISORDER, COMBINED TYPE: ICD-10-CM

## 2025-03-12 DIAGNOSIS — E11.9 TYPE 2 DIABETES MELLITUS WITHOUT COMPLICATION, WITHOUT LONG-TERM CURRENT USE OF INSULIN (HCC): Primary | ICD-10-CM

## 2025-03-12 DIAGNOSIS — G43.109 MIGRAINE WITH AURA AND WITHOUT STATUS MIGRAINOSUS, NOT INTRACTABLE: ICD-10-CM

## 2025-03-12 DIAGNOSIS — Z12.11 COLON CANCER SCREENING: ICD-10-CM

## 2025-03-12 DIAGNOSIS — Z13.29 SCREENING FOR THYROID DISORDER: ICD-10-CM

## 2025-03-12 DIAGNOSIS — E66.01 MORBID (SEVERE) OBESITY DUE TO EXCESS CALORIES (HCC): Chronic | ICD-10-CM

## 2025-03-12 PROBLEM — M84.453A INSUFFICIENCY FRACTURE OF MEDIAL FEMORAL CONDYLE (HCC): Status: RESOLVED | Noted: 2024-05-09 | Resolved: 2025-03-12

## 2025-03-12 LAB
CREAT UR-MCNC: 120.2 MG/DL
MICROALBUMIN UR-MCNC: 8.9 MG/L
MICROALBUMIN/CREAT 24H UR: 7 MG/G CREATININE (ref 0–30)
SL AMB POCT HEMOGLOBIN AIC: 6.7 (ref ?–6.5)

## 2025-03-12 PROCEDURE — 82043 UR ALBUMIN QUANTITATIVE: CPT | Performed by: FAMILY MEDICINE

## 2025-03-12 PROCEDURE — 82570 ASSAY OF URINE CREATININE: CPT | Performed by: FAMILY MEDICINE

## 2025-03-12 PROCEDURE — 83036 HEMOGLOBIN GLYCOSYLATED A1C: CPT | Performed by: FAMILY MEDICINE

## 2025-03-12 PROCEDURE — 99204 OFFICE O/P NEW MOD 45 MIN: CPT | Performed by: FAMILY MEDICINE

## 2025-03-12 RX ORDER — SUMATRIPTAN 50 MG/1
50 TABLET, FILM COATED ORAL ONCE AS NEEDED
Qty: 10 TABLET | Refills: 5 | Status: SHIPPED | OUTPATIENT
Start: 2025-03-12

## 2025-03-12 RX ORDER — VALSARTAN 80 MG/1
80 TABLET ORAL DAILY
Qty: 30 TABLET | Refills: 5 | Status: SHIPPED | OUTPATIENT
Start: 2025-03-12

## 2025-03-12 RX ORDER — ACYCLOVIR 400 MG/1
1 TABLET ORAL ONCE
Qty: 1 EACH | Refills: 0 | Status: SHIPPED | OUTPATIENT
Start: 2025-03-12 | End: 2025-03-12

## 2025-03-12 RX ORDER — ACYCLOVIR 400 MG/1
1 TABLET ORAL
Qty: 9 EACH | Refills: 3 | Status: SHIPPED | OUTPATIENT
Start: 2025-03-12

## 2025-03-12 RX ORDER — MULTIVITAMIN
1 TABLET ORAL DAILY
COMMUNITY

## 2025-03-12 RX ORDER — TIRZEPATIDE 2.5 MG/.5ML
2.5 INJECTION, SOLUTION SUBCUTANEOUS WEEKLY
Qty: 2 ML | Refills: 0 | Status: SHIPPED | OUTPATIENT
Start: 2025-03-12 | End: 2025-03-14

## 2025-03-12 RX ORDER — METFORMIN HYDROCHLORIDE 500 MG/1
500 TABLET, EXTENDED RELEASE ORAL
Qty: 30 TABLET | Refills: 5 | Status: SHIPPED | OUTPATIENT
Start: 2025-03-12

## 2025-03-12 NOTE — ASSESSMENT & PLAN NOTE
Previously on medication for this. Not sure what med she was on  Off meds since 2021. Just ran out.   Has been checking bp in ambulatory setting. Readings elevated.   153/97 last night.   Recommend she resume taking medication.   Rx for valsartan 80 to pharmacy    Orders:    valsartan (DIOVAN) 80 mg tablet; Take 1 tablet (80 mg total) by mouth daily

## 2025-03-12 NOTE — PROGRESS NOTES
Diabetic Foot Exam    Patient's shoes and socks removed.    Right Foot/Ankle   Right Foot Inspection  Skin Exam: skin normal, skin intact, callus and callus. No dry skin, no warmth, no erythema, no maceration, no abnormal color, no pre-ulcer and no ulcer.     Toe Exam: ROM and strength within normal limits and swelling. No tenderness    Sensory   Vibration: intact  Proprioception: intact  Monofilament testing: intact    Vascular  Capillary refills: < 3 seconds  The right DP pulse is 2+. The right PT pulse is 2+.     Left Foot/Ankle  Left Foot Inspection  Skin Exam: skin normal, skin intact and callus. No dry skin, no warmth, no erythema, no maceration, normal color, no pre-ulcer and no ulcer.     Toe Exam: ROM and strength within normal limits, swelling and tenderness. No erythema and no left toe deformity.     Sensory   Vibration: intact  Proprioception: intact  Monofilament testing: intact    Vascular  Capillary refills: < 3 seconds  The left DP pulse is 2+. The left PT pulse is 2+.     Assign Risk Category  No deformity present  No loss of protective sensation  No weak pulses  Risk: 0

## 2025-03-12 NOTE — LETTER
Procedure Request Form: Cervical Cancer Screening      Date Requested: 25  Patient: Shani Gallegos  Patient : 1975   Referring Provider: Chelsey Ojeda, DO        Date of Procedure ______________________________       The above patient has informed us that they have completed their   most recent Cervical Cancer Screening at your facility. Please complete   this form and attach all corresponding procedure reports/results.    Comments __Most Recent  ________________________________________________________  ____________________________________________________________________  ____________________________________________________________________  ____________________________________________________________________    Facility Completing Procedure _________________________________________    Form Completed By (print name) _______________________________________      Signature __________________________________________________________      These reports are needed for  compliance.    Please fax this completed form and a copy of the procedure report to the Value Based Department as soon as possible via Fax [phone], michelle Grey: Phone [phone]. Our office is located at [address] [place] [address].    We thank you for your assistance in treating our mutual patient.

## 2025-03-12 NOTE — TELEPHONE ENCOUNTER
Reason for call:   [x] Prior Auth  [] Other:     Caller:  [x] Patient  [] Pharmacy  Name:   Address:   Callback Number:     Medication: Tirzepatide (Mounjaro) 2.5 MG/0.5ML SOAJ     Dose/Frequency: Inject 2.5 mg under the skin once a week     Quantity: 2 ml    Ordering Provider:   [x] PCP/Provider -   [] Speciality/Provider -     Has the patient tried other medications and failed? If failed, which medications did they fail?    [] No   [] Yes -     Is the patient's insurance updated in EPIC?   [x] Yes   [] No     Is a copy of the patient's insurance scanned in EPIC?   [x] Yes   [] No

## 2025-03-12 NOTE — TELEPHONE ENCOUNTER
----- Message from Brenda RICHARDSON sent at 3/12/2025 10:58 AM EDT -----  Regarding: Care Gap Request  08/01/24 8:48 AM    Hello, our patient No patient name on file. has had Mammogram completed/performed. Please assist in updating the patient chart by making an External outreach to [facility] located in [place]. The date of service is [id number].    Thank you,  Ashleigh Fothergill  [facility]

## 2025-03-12 NOTE — ASSESSMENT & PLAN NOTE
Previously on prophylaxis with topamax. Caused memory issues and hair loss  Was also on imitrex  Currently without meds  5 headaches per month.   Needs rx for imitrex. Rx sent    Orders:    SUMAtriptan (IMITREX) 50 mg tablet; Take 1 tablet (50 mg total) by mouth once as needed for migraine for up to 1 dose may repeat in 2 hours if necessary

## 2025-03-12 NOTE — TELEPHONE ENCOUNTER
----- Message from Brenda RICHARDSON sent at 3/12/2025 11:16 AM EDT -----  Regarding: Care Gap Request  08/01/24 8:48 AM    Hello, our patient No patient name on file. has had Pap Smear (HPV) aka Cervical Cancer Screening completed/performed. Please assist in updating the patient chart by making an External outreach to [facility] facility located in [place]. The date of service is Unknown.    Thank you,  Ashleigh Fothergill  [facility]   Post herpetic neuralgia

## 2025-03-12 NOTE — TELEPHONE ENCOUNTER
Upon review of the In Basket request and the patient's chart, initial outreach has been made via fax to facility. Please see Contacts section for details.     Thank you  Matilda Miranda MA     Upon review of the In Basket request we were able to locate, review, and update the patient chart as requested for Mammogram.    Any additional questions or concerns should be emailed to the Practice Liaisons via the appropriate education email address, please do not reply via In Basket.    Thank you  Matilda Miranda MA   [facility]

## 2025-03-12 NOTE — LETTER
Procedure Request Form: Cervical Cancer Screening      Date Requested: 25  Patient: Shani Gallegos  Patient : 1975   Referring Provider: Chelsey Ojeda, DO        Date of Procedure ______________________________       The above patient has informed us that they have completed their   most recent Cervical Cancer Screening at your facility. Please complete   this form and attach all corresponding procedure reports/results.    Comments __Most recent  ________________________________________________________  ____________________________________________________________________  ____________________________________________________________________  ____________________________________________________________________    Facility Completing Procedure _________________________________________    Form Completed By (print name) _______________________________________      Signature __________________________________________________________      These reports are needed for  compliance.    Please fax this completed form and a copy of the procedure report to our office located at [address] [place] [address] as soon as possible to Fax [phone] attention Matilda: Phone [phone]    We thank you for your assistance in treating our mutual patient.

## 2025-03-12 NOTE — ASSESSMENT & PLAN NOTE
Sleep study in July of 2020 at [facility] showed  This home sleep apnea test confirms a diagnosis of obstructive sleep apnea   (CHRIS). The overall severity was in the mild range [normal AHI range 0-5;   mild range 5-15; moderate range 15-30; severe range > 30]. The AHI may   have been underestimated since total sleep time is not known.   Therapeutic options to treat her sleep disordered breathing include auto   CPAP, oral appliance or positional therapy. In addition, the patient   should be encouraged to lose weight. Follow up will be with the [facility]      Currently not on cpap  Wants to resume  Refer to sleep medicine      Orders:    Ambulatory Referral to Sleep Medicine; Future

## 2025-03-13 ENCOUNTER — OFFICE VISIT (OUTPATIENT)
Dept: SLEEP CENTER | Facility: CLINIC | Age: 50
End: 2025-03-13
Payer: COMMERCIAL

## 2025-03-13 ENCOUNTER — TELEPHONE (OUTPATIENT)
Dept: FAMILY MEDICINE CLINIC | Facility: CLINIC | Age: 50
End: 2025-03-13

## 2025-03-13 VITALS — HEIGHT: 62 IN | SYSTOLIC BLOOD PRESSURE: 145 MMHG | BODY MASS INDEX: 42.46 KG/M2 | DIASTOLIC BLOOD PRESSURE: 77 MMHG

## 2025-03-13 DIAGNOSIS — E11.9 TYPE 2 DIABETES MELLITUS WITHOUT COMPLICATION, WITHOUT LONG-TERM CURRENT USE OF INSULIN (HCC): Primary | ICD-10-CM

## 2025-03-13 DIAGNOSIS — E66.813 CLASS 3 SEVERE OBESITY WITH BODY MASS INDEX (BMI) OF 40.0 TO 44.9 IN ADULT, UNSPECIFIED OBESITY TYPE, UNSPECIFIED WHETHER SERIOUS COMORBIDITY PRESENT (HCC): Primary | ICD-10-CM

## 2025-03-13 DIAGNOSIS — G47.33 OBSTRUCTIVE SLEEP APNEA: ICD-10-CM

## 2025-03-13 DIAGNOSIS — E66.01 CLASS 3 SEVERE OBESITY WITH BODY MASS INDEX (BMI) OF 40.0 TO 44.9 IN ADULT, UNSPECIFIED OBESITY TYPE, UNSPECIFIED WHETHER SERIOUS COMORBIDITY PRESENT (HCC): Primary | ICD-10-CM

## 2025-03-13 PROCEDURE — 99203 OFFICE O/P NEW LOW 30 MIN: CPT | Performed by: INTERNAL MEDICINE

## 2025-03-13 NOTE — TELEPHONE ENCOUNTER
Medication prior auths started for three new medications today through [facility]    Mounjaro: [id number]  Sumatriptan: [id number]  DexCom7 : [id number]    Awaiting determination.

## 2025-03-13 NOTE — PROGRESS NOTES
Name: Shani Gallegos      : 1975      MRN: [id number]  Encounter Provider: Alverto Pizarro MD  Encounter Date: 3/13/2025   Encounter department: [facility] SLEEP [facility] [place]  :  Assessment & Plan  Obstructive sleep apnea  History of mild obstructive sleep apnea, initially did not acclimate to CPAP.  Now with weight gain has worse symptoms of poor sleep quality, daytime sleepiness, snoring, gasping/choking, witnessed apneas.    She wants to try CPAP again.    Home sleep apnea test ordered.  If diagnosed with CHRIS, will order CPAP at 4-15 cm H2O  If home sleep apnea test is negative I would think that this is a false negative and I would highly consider a repeat home sleep study or an in lab polysomnography  Orders:    Ambulatory Referral to Sleep Medicine    Home Study; Future    Class 3 severe obesity with body mass index (BMI) of 40.0 to 44.9 in adult, unspecified obesity type, unspecified whether serious comorbidity present (HCC)  Working on lifestyle modifications.  Also started on Mounjaro which should help with weight loss           History of Present Illness   HPI   49 year old female with history of allergic rhinitis, hypertension, fatty liver, osteoarthritis, PTSD, cigarette smoking in remission,esophageal dilations, GERD, c section x 2 , lipectomy with translocation umbilicus, incisional hernia repair with mesh (, Jones), Factor 5 Leiden, migraines, ADHD, GERD, deviated septum from broken nose with repair/reconstruction in 2017, who is presenting for evaluation of CHRIS    In 2020 had sleep study that showed CHRIS and tried a CPAP.  She has worsening symptoms of sleep apnea and louder snoring since then with about 30 lbs weight gain.  She has gasping/choking, witnessed apneas.  Super dry tongue in the morning.  Headaches in the morning.      She had trouble using CPAP and in 2020 and she felt uncomfortable and embarrassed by the machine.      A lot of bloody secretions from the nose.  She  uses a humidifier in the bedroom.      Nausea and vomiting in sleep sometimes due to acid reflux.     Just prescribed Mounjaro - waiting for approval.  Mild diabetes mellitus            Sitting and reading: Moderate chance of dozing  Watching TV: Moderate chance of dozing  Sitting, inactive in a public place (e.g. a theatre or a meeting): Moderate chance of dozing  As a passenger in a car for an hour without a break: Moderate chance of dozing  Lying down to rest in the afternoon when circumstances permit: High chance of dozing  Sitting and talking to someone: Slight chance of dozing  Sitting quietly after a lunch without alcohol: Slight chance of dozing  In a car, while stopped for a few minutes in traffic: Would never doze  Total score: 13     Review of Systems  Pertinent positives/negatives included in HPI and also as noted:     Past Medical History   Past Medical History:   Diagnosis Date    Cirrhosis (HCC)     Fatty liver     GERD (gastroesophageal reflux disease)     Hypertension     Hypoglycemia     Insufficiency fracture of medial femoral condyle (HCC) 2024    Low back pain     Migraines     Neck pain     Umbilical hernia without obstruction and without gangrene 2021    CT Abdomen and pelvis 25 done for complaint of abdominal pain showed incidental finding of   Enlarging fat-containing paraumbilical hernia with mild inflammatory   stranding of the herniated fat   Underwent surgery for incarcerated ventral hernia repair with dr pickard on 25       Past Surgical History:   Procedure Laterality Date    ABDOMINAL SURGERY  11/10/2015    LIPECTOMY WITH TRANSLOCATION UMBILICUS INCISIONAL HERNIA REPAIR WITH MESH by Dr. Behman and Dr. Bray on 11/10     SECTION      LIPECTOMY  11/10/2015    dr pickard    VENTRAL HERNIA REPAIR  2025    dr pickard at [facility]     Family History   Problem Relation Age of Onset    Lung cancer Mother     Diabetes Mother     Clotting disorder Father     Pulmonary  "embolism Father     Lung cancer Paternal Grandfather       reports that she quit smoking about 6 months ago. Her smoking use included cigarettes. She started smoking about 20 years ago. She has a 2 pack-year smoking history. She has been exposed to tobacco smoke. She has never used smokeless tobacco. She reports that she does not currently use alcohol. She reports that she does not use drugs.  Current Outpatient Medications   Medication Instructions    Continuous Glucose Sensor (Dexcom G7 Sensor) 1 Device, Does not apply, Every 10 days    metFORMIN (GLUCOPHAGE-XR) 500 mg, Oral, Daily with dinner    Mounjaro 2.5 mg, Subcutaneous, Weekly    Multiple Vitamin (multivitamin) tablet 1 tablet, Daily    SUMAtriptan (IMITREX) 50 mg, Oral, Once as needed, may repeat in 2 hours if necessary    valsartan (DIOVAN) 80 mg, Oral, Daily   No Known Allergies   Objective   /77 Comment: Taken on 3/12/25  Ht 5' 2.25\" (1.581 m)   BMI 42.46 kg/m²     Neck Circumference: 19  Physical Exam  Constitutional:       General: She is not in acute distress.     Appearance: Normal appearance. She is not ill-appearing, toxic-appearing or diaphoretic.   HENT:      Mouth/Throat:      Mouth: Mucous membranes are moist.      Pharynx: Oropharynx is clear. No oropharyngeal exudate.      Comments: Mallampati 4  Uvula size is normal  Eyes:      General: No scleral icterus.     Extraocular Movements: Extraocular movements intact.   Cardiovascular:      Rate and Rhythm: Normal rate.   Pulmonary:      Effort: Pulmonary effort is normal. No respiratory distress.      Breath sounds: Normal breath sounds.   Abdominal:      Tenderness: There is no guarding.   Musculoskeletal:         General: Normal range of motion.      Cervical back: Normal range of motion and neck supple. No rigidity.      Right lower leg: No edema.      Left lower leg: No edema.   Neurological:      General: No focal deficit present.      Mental Status: She is alert and oriented to " "person, place, and time.         Data  No results found for: \"HGB\", \"HCT\", \"MCV\"   Lab Results   Component Value Date    CALCIUM 8.9 01/19/2025    K 4.1 01/19/2025    CO2 26 01/19/2025     01/19/2025    BUN 11 01/19/2025    CREATININE 0.74 01/19/2025     Lab Results   Component Value Date    IRON 41 (L) 01/19/2025    TIBC 340 01/19/2025    FERRITIN 69 01/19/2025     Lab Results   Component Value Date    AST 50 (H) 01/18/2025    ALT 37 01/18/2025         "

## 2025-03-13 NOTE — TELEPHONE ENCOUNTER
This screenshot that the rep sent makes absolutely no sense --- there must be some part of the message that is cut off, like the 1, 2 of message.     Wondering if they even sent the correct information for prior auth as this patient has multiple co morbid conditions including hypertension, CHRIS, fatty liver, cirrhosis.   Can you look into why it was denied since message is so vague?

## 2025-03-13 NOTE — PATIENT INSTRUCTIONS
SLEEP STUDY SCHEDULING  Please schedule the sleep study as we discussed  A nurse will usually be in touch with you  within 1-2 weeks of the study to discuss findings unless there are any urgent findings noted in the sleep study.  Emergencies are rare in sleep studies.    After the test is read (typically within 1-2 week) and you are diagnosed with sleep apnea, usually I will order a PAP machine and/or supplies if indicated by the results.   If you saw me in a [facility] Pulmonary Office.  The Pulmonary Office will process the CPAP prescription  If the test is inconclusive, my office will be in touch to determine the next step (sooner follow-up, further testing etc)  If we discussed in the office that you should follow up in person before starting treatment we will arrange for that  I would then want to see you for a followup visit about 1-2 months after you receive your machine at home to see how you are doing with the machine.   Please bring your machine to the first visit in case we need to download data from it or check that supplies are adequate.    If you have any difficulties using the machine, please contact the medical supply company for machine related issues (mask fit, leakage, question about settings) or contact our office if you have side effects, concern about the air pressure, or symptoms of concern.      WHAT HAPPENS AFTER WE PRESCRIBE CPAP or BIPAP:  You will ultimately receive your machine and regular supplies from a [employer] (durable medical equipment) company. Usually we will send the prescription to [facility], also known as [facility]  You should be eligible for new supplies approximately every 3-6 months, depending on your insurance coverage.  If your mask doesn't fit well, call our office to schedule a formal mask fitting.  Insurance requires regular usage and periodic office follow ups for PAP therapy, to continue to cover supplies.  Insurance requires a follow-up in the office within 90  days of starting your new PAP device.  As stated above, this will be scheduled by our nursing staff when they call you      IMPORTANT CONTACT PHONE NUMBERS:  Both the Sleep Medicine and Pulmonary Department manages CPAP/BIPAPs.  If depends on where you are typically seen on who to contact  [facility] Nursing Support: [phone] - Option 3 (if you are seen in a [facility] Sleep Medicine office)  [facility] Pulmonary: [phone] (if you are seen in a [facility] Pulmonary office)  [facility] - [phone] (Mobile), [phone] ([place])  If you are unsure, please send me a message on [employer] and I can help

## 2025-03-13 NOTE — ASSESSMENT & PLAN NOTE
History of mild obstructive sleep apnea, initially did not acclimate to CPAP.  Now with weight gain has worse symptoms of poor sleep quality, daytime sleepiness, snoring, gasping/choking, witnessed apneas.    She wants to try CPAP again.    Home sleep apnea test ordered.  If diagnosed with CHRIS, will order CPAP at 4-15 cm H2O  If home sleep apnea test is negative I would think that this is a false negative and I would highly consider a repeat home sleep study or an in lab polysomnography  Orders:    Ambulatory Referral to Sleep Medicine    Home Study; Future

## 2025-03-13 NOTE — TELEPHONE ENCOUNTER
PA for (Mounjaro) 2.5 MG/0.5ML SOAJ [place]ED to [place] [employer]    via    [x]CMM-KEY: [id number]  []Surescripts-Case ID #   []Availity-Auth ID # NDC #   []Faxed to plan  []Other website   []Phone call Case ID #     [x]PA sent as URGENT    All office notes, labs and other pertaining documents and studies sent. Clinical questions answered. Awaiting determination from insurance company.     Turnaround time for your insurance to make a decision on your Prior Authorization can take 7-21 business days.

## 2025-03-14 ENCOUNTER — TELEPHONE (OUTPATIENT)
Dept: SLEEP CENTER | Facility: CLINIC | Age: 50
End: 2025-03-14

## 2025-03-14 DIAGNOSIS — G47.33 OBSTRUCTIVE SLEEP APNEA: Primary | ICD-10-CM

## 2025-03-14 PROBLEM — E11.9 TYPE 2 DIABETES MELLITUS WITHOUT COMPLICATION, WITHOUT LONG-TERM CURRENT USE OF INSULIN (HCC): Status: ACTIVE | Noted: 2025-03-14

## 2025-03-14 NOTE — TELEPHONE ENCOUNTER
Patient cannot have a home sleep study due to having [place] Medicaid as insurance. Please place an order for a diagnostic sleep study.

## 2025-03-14 NOTE — TELEPHONE ENCOUNTER
Mounjaro denied.   Will send alternative.   Ozempic is on their preferred list.   Let patient know. Thanks.

## 2025-03-17 ENCOUNTER — TELEPHONE (OUTPATIENT)
Dept: PSYCHIATRY | Facility: CLINIC | Age: 50
End: 2025-03-17

## 2025-03-17 ENCOUNTER — TELEPHONE (OUTPATIENT)
Age: 50
End: 2025-03-17

## 2025-03-17 ENCOUNTER — HOSPITAL ENCOUNTER (OUTPATIENT)
Dept: MAMMOGRAPHY | Facility: IMAGING CENTER | Age: 50
Discharge: HOME | End: 2025-03-17
Payer: COMMERCIAL

## 2025-03-17 VITALS — WEIGHT: 234 LBS | HEIGHT: 62 IN | BODY MASS INDEX: 43.06 KG/M2

## 2025-03-17 DIAGNOSIS — Z12.31 ENCOUNTER FOR SCREENING MAMMOGRAM FOR MALIGNANT NEOPLASM OF BREAST: ICD-10-CM

## 2025-03-17 DIAGNOSIS — F90.2 ATTENTION DEFICIT HYPERACTIVITY DISORDER, COMBINED TYPE: Primary | ICD-10-CM

## 2025-03-17 PROCEDURE — 77063 BREAST TOMOSYNTHESIS BI: CPT

## 2025-03-17 PROCEDURE — 77067 SCR MAMMO BI INCL CAD: CPT

## 2025-03-17 RX ORDER — DEXTROAMPHETAMINE SACCHARATE, AMPHETAMINE ASPARTATE, DEXTROAMPHETAMINE SULFATE AND AMPHETAMINE SULFATE 7.5; 7.5; 7.5; 7.5 MG/1; MG/1; MG/1; MG/1
30 TABLET ORAL 2 TIMES DAILY
Qty: 42 TABLET | Refills: 0 | Status: SHIPPED | OUTPATIENT
Start: 2025-03-17 | End: 2025-03-26 | Stop reason: SDUPTHER

## 2025-03-17 RX ORDER — ACYCLOVIR 400 MG/1
TABLET ORAL
COMMUNITY
Start: 2025-03-12

## 2025-03-17 NOTE — TELEPHONE ENCOUNTER
Sent it to [facility] where I believe that she picked it up last.  If she would like a different location I can send it there instead

## 2025-03-17 NOTE — TELEPHONE ENCOUNTER
Patient called in to inquire if their refill for the following medication was sent to the pharmacy:    amphetamine-dextroamphetamine (ADDERALL, 30MG,) 30 MG tablet     Writer advised the patient it was released to the pharmacy on 3.17.25
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
## 2025-03-17 NOTE — TELEPHONE ENCOUNTER
Writer called client who confirmed she is in need of medications.    Client has an appointment scheduled to see Gayle Baker for the BRIDGET (scheduled as a NP) Dr. Baum informed writer he could do a 2 week fill to hold her over.    Client noted that she was on 30 mg Adderall 2x a day.     Writer forwarded message to Dr. Baum.     Client would like to express great appreciation to Dr. Baum for his response to her request.    Client's appointment changed from NP to BRIDGET in coding.

## 2025-03-21 NOTE — TELEPHONE ENCOUNTER
As a follow-up, a second attempt has been made for outreach via fax to facility. Please see Contacts section for details.    Thank you  Matilda Miranda MA

## 2025-03-24 ENCOUNTER — RESULTS FOLLOW-UP (OUTPATIENT)
Dept: FAMILY MEDICINE CLINIC | Facility: CLINIC | Age: 50
End: 2025-03-24

## 2025-03-24 ENCOUNTER — TELEPHONE (OUTPATIENT)
Age: 50
End: 2025-03-24

## 2025-03-24 NOTE — TELEPHONE ENCOUNTER
Patient called to review mammogram results as her MyChart is not connecting. Informed Patient of note that accompanied the images. Patient pleased with results.

## 2025-03-24 NOTE — TELEPHONE ENCOUNTER
Upon review of the In Basket request we were able to locate, review, and update the patient chart as requested for Mammogram and Pap Smear (HPV) aka Cervical Cancer Screening.    Any additional questions or concerns should be emailed to the Practice Liaisons via the appropriate education email address, please do not reply via In Basket.    Thank you  Matilda Miranda MA   [facility]

## 2025-03-25 NOTE — BH TREATMENT PLAN
TREATMENT PLAN (Medication Management Only)        [facility]    Name and Date of Birth:  Shani Gallegos 49 y.o. 1975  MRN: [id number]  Date of Treatment Plan: 3/26/2025  Diagnosis/Diagnoses:  PTSD, ADHD, Adjustment disorder with anxiety  Strengths/Personal Resources for Self-Care: taking medications as prescribed, ability to adapt to life changes, ability to communicate needs, ability to communicate well, ability to listen, ability to reason, ability to understand psychiatric illness, average or above intelligence.  Area/Areas of need (in own words): ADHD symptoms  1. Long Term Goal:   maintain control of ADHD symptoms.  Target Date: 9/26/2025  Person/Persons responsible for completion of goal: Shani  2.  Short Term Objective (s) - How will we reach this goal?:   A.  Provider new recommended medication/dosage changes and/or continue medication(s): continue current medications as prescribed.  B.  Maintain medication compliance .  C.  Attend follow up appointments as scheduled .  Target Date: 9/26/2025  Person/Persons Responsible for Completion of Goal: Shani  Progress Towards Goals: continuing treatment  Treatment Modality: medication management every 8 weeks  Review due 180 days from date of this plan:  9/26/2025  Expected length of service: ongoing treatment unless revised  My Physician/PA/NP and I have developed this plan together and I agree to work on the goals and objectives. I understand the treatment goals that were developed for my treatment.   Electronic Signatures: on file (unless signed below)    RYAN Meek 03/25/25

## 2025-03-26 ENCOUNTER — OFFICE VISIT (OUTPATIENT)
Dept: PSYCHIATRY | Facility: CLINIC | Age: 50
End: 2025-03-26
Payer: COMMERCIAL

## 2025-03-26 DIAGNOSIS — F33.9 EPISODE OF RECURRENT MAJOR DEPRESSIVE DISORDER, UNSPECIFIED DEPRESSION EPISODE SEVERITY (HCC): Primary | ICD-10-CM

## 2025-03-26 DIAGNOSIS — F90.2 ATTENTION DEFICIT HYPERACTIVITY DISORDER, COMBINED TYPE: ICD-10-CM

## 2025-03-26 DIAGNOSIS — F43.22 ADJUSTMENT DISORDER WITH ANXIETY: ICD-10-CM

## 2025-03-26 DIAGNOSIS — F43.10 POSTTRAUMATIC STRESS DISORDER: ICD-10-CM

## 2025-03-26 PROBLEM — F32.9 MDD (MAJOR DEPRESSIVE DISORDER): Status: ACTIVE | Noted: 2025-03-26

## 2025-03-26 PROCEDURE — 99215 OFFICE O/P EST HI 40 MIN: CPT

## 2025-03-26 RX ORDER — DEXTROAMPHETAMINE SACCHARATE, AMPHETAMINE ASPARTATE, DEXTROAMPHETAMINE SULFATE AND AMPHETAMINE SULFATE 7.5; 7.5; 7.5; 7.5 MG/1; MG/1; MG/1; MG/1
30 TABLET ORAL 2 TIMES DAILY
Qty: 60 TABLET | Refills: 0 | Status: SHIPPED | OUTPATIENT
Start: 2025-04-07

## 2025-03-31 NOTE — TELEPHONE ENCOUNTER
----- Message from Chelsey Ojeda DO sent at 3/31/2025  3:09 PM EDT -----  No microcalcifications.

## 2025-03-31 NOTE — TELEPHONE ENCOUNTER
Patient called for results of her Mammogram.  Read her the message from Dr. Ojeda and the patient asked if there was any micro calcifications?  Please verify to patient.

## 2025-04-24 DIAGNOSIS — E11.9 TYPE 2 DIABETES MELLITUS WITHOUT COMPLICATION, WITHOUT LONG-TERM CURRENT USE OF INSULIN (HCC): Primary | ICD-10-CM

## 2025-04-24 RX ORDER — KETOROLAC TROMETHAMINE 30 MG/ML
1 INJECTION, SOLUTION INTRAMUSCULAR; INTRAVENOUS ONCE
Qty: 1 EACH | Refills: 0 | Status: CANCELLED | OUTPATIENT
Start: 2025-04-24 | End: 2025-04-24

## 2025-04-24 RX ORDER — LANCETS 28 GAUGE
EACH MISCELLANEOUS
Qty: 100 EACH | Refills: 0 | Status: SHIPPED | OUTPATIENT
Start: 2025-04-24

## 2025-04-24 NOTE — TELEPHONE ENCOUNTER
Pt came into office noting she missed her appt yesterday. Was able to scheduled pt at next soonest appt, 5/21/25. Pt noted that she used the dexcom for a few days and believes the readings were inaccurate so removed the device. Pt asking for traditional glucose meter to check blood sugars. Pt noted if sooner appt is available for ozempic f/u, to call at [phone]. Provided pt with outstanding lab orders. free

## 2025-04-24 NOTE — TELEPHONE ENCOUNTER
The order that was teed up for me to sign is for free style alex which is one of the continuous glucose monitors.   She already has a dexcom so doubt that insurance will cover another if she already has one.   Is this what she wants or does she want the traditional/stick your finger kind?

## 2025-05-07 DIAGNOSIS — F90.2 ATTENTION DEFICIT HYPERACTIVITY DISORDER, COMBINED TYPE: ICD-10-CM

## 2025-05-07 RX ORDER — DEXTROAMPHETAMINE SACCHARATE, AMPHETAMINE ASPARTATE, DEXTROAMPHETAMINE SULFATE AND AMPHETAMINE SULFATE 7.5; 7.5; 7.5; 7.5 MG/1; MG/1; MG/1; MG/1
TABLET ORAL
Qty: 60 TABLET | Refills: 0 | Status: SHIPPED | OUTPATIENT
Start: 2025-05-07

## 2025-05-07 NOTE — TELEPHONE ENCOUNTER
Shani Gallegos called the clinic today and appropriately requested refill of controlled psychotropic medications Adderall. Review of PDMP website reveals no concerns for abuse or misuse. As such, will refill script today for 30-days.

## 2025-05-07 NOTE — TELEPHONE ENCOUNTER
Client is requesting a temporary refill of her Adderall (30mg) prescription to make it to her next follow up on 5/21

## 2025-05-16 NOTE — PSYCH
MEDICATION MANAGEMENT NOTE    Name: Shani Gallegos      : 1975      MRN: [id number]  Encounter Provider: RYAN Meek  Encounter Date: 2025   Encounter department: [facility] OUTPATIENT    Insurance: Payor: [facility] / Plan: [facility] [employer] MED[employer] / Product Type: Medicaid HMO /      Reason for Visit: Follow-up for medication management:  Assessment & Plan  Episode of recurrent major depressive disorder, unspecified depression episode severity (HCC)         Adjustment disorder with anxiety         Posttraumatic stress disorder         Attention deficit hyperactivity disorder, combined type    Orders:    amphetamine-dextroamphetamine (ADDERALL) 30 MG tablet; Take 1 tablet (30 mg total) by mouth 2 (two) times a day Max Daily Amount: 60 mg Do not start before 2025.    amphetamine-dextroamphetamine (ADDERALL, 30MG,) 30 MG tablet; Take 1 tablet (30 mg total) by mouth 2 (two) times a day Max Daily Amount: 60 mg Do not start before 2025.    amphetamine-dextroamphetamine (ADDERALL, 30MG,) 30 MG tablet; Take 1 tablet (30 mg total) by mouth 2 (two) times a day Max Daily Amount: 60 mg Do not start before 2025.      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 Next crisis plan due unable to update due to signature pad malfunction    Treatment Recommendations:    Educated about diagnosis and treatment modalities. Verbalizes understanding and agreement with the treatment plan.  Discussed self monitoring of  symptoms, and symptom monitoring tools.  Discussed medications and if treatment adjustment was needed or desired.  Aware of 24 hour and weekend coverage for urgent situations accessed by calling [facility] main practice number  I am scheduling this patient out for greater than 3 months: No    Medications Risks/Benefits:      Risks, Benefits And Possible Side Effects Of Medications:    Risks, benefits, and possible side effects of medications explained to Shani and she (or legal representative) verbalizes understanding and agreement for treatment.    Controlled Medication Discussion:     Shani has been filling controlled prescriptions on time as prescribed according to [place] Prescription Drug Monitoring Program.  Discussed with Shani the risks of impairment of ability to drive and potential for abuse and addiction related to treatment with stimulant medications. She understands risk of treatment with stimulant medications, agrees to not drive if feels impaired and agrees to take medications as prescribed.  Shani is using medication appropriately.      History of Present Illness     HPI    Shani is a 49 year old female being seen today for a follow-up for medication management . Patient has psychiatric diagnoses including Ajustment disorder with anxiety, ADHD,Paranoid personality disorder and PTSD. Patient is currently being prescribed  Adderall 30 mg BID . Patient is not currently connected to outpatient therapy, and is not interested in being placed on the wait list. No additional services in place at this time.     Shani reports medication compliance and denies any side effects at this time.  She states she recently lost 21 pounds in approximately 6 to 8 weeks and is feeling better.  She found out she has cirrhosis which is metabolic in nature and not from drinking.  She states she possibly could need a liver transplant in 3 to 5 years.  She continues on Ozempic for  "her diabetes and also has become aware that it could reverse cirrhosis of the liver.  Shani reports work has been going better and has been letting a lot of things \"roll off her back\".  She got a new phone and it is working properly.  Shani reports her mood is \"good\".  Sleep and appetite remain adequate.  Energy levels and motivation are okay.  Shani denies SI, HI, AH, or VH.  Denies any paranoia or paranoid thoughts.  Shain does not endorse any mood swings or irritability.  Denies any periods of elevated mood or milo.  Denies any anxiety or depression today and feels her ADHD symptoms are well-controlled with the Adderall.  No medication changes at this time    Review Of Systems: A review of systems is obtained and is negative except for the pertinent positives listed in HPI/Subjective above.      Current Rating Scores:     None completed today.    Areas of Improvement: reviewed in HPI/Subjective Section and reviewed in Assessment and Plan Section      Past Medical History:   Diagnosis Date    Cirrhosis (HCC)     Fatty liver     GERD (gastroesophageal reflux disease)     Hypertension     Hypoglycemia     Insufficiency fracture of medial femoral condyle (HCC) 2024    Low back pain     Migraines     Neck pain     Umbilical hernia without obstruction and without gangrene 2021    CT Abdomen and pelvis 25 done for complaint of abdominal pain showed incidental finding of   Enlarging fat-containing paraumbilical hernia with mild inflammatory   stranding of the herniated fat   Underwent surgery for incarcerated ventral hernia repair with dr pickard on 25       Past Surgical History:   Procedure Laterality Date    ABDOMINAL SURGERY  11/10/2015    LIPECTOMY WITH TRANSLOCATION UMBILICUS INCISIONAL HERNIA REPAIR WITH MESH by Dr. Behman and Dr. Bray on 11/10    BREAST BIOPSY Right     needle biopsy, stereo, benign     SECTION      LIPECTOMY  11/10/2015    dr pickard    VENTRAL HERNIA " REPAIR  2025    dr pickard at [facility]     Allergies: Allergies[1]    Current Outpatient Medications   Medication Instructions    [START ON 2025] amphetamine-dextroamphetamine (ADDERALL) 30 MG tablet 30 mg, Oral, 2 times daily    [START ON 2025] amphetamine-dextroamphetamine (ADDERALL, 30MG,) 30 MG tablet 30 mg, Oral, 2 times daily    [START ON 2025] amphetamine-dextroamphetamine (ADDERALL, 30MG,) 30 MG tablet 30 mg, Oral, 2 times daily    Continuous Glucose  (Dexcom G7 ) SHANNA     Continuous Glucose Sensor (Dexcom G7 Sensor) 1 Device, Does not apply, Every 10 days    glucose blood test strip Use as instructed    Lancets (freestyle) lancets Use as instructed    Multiple Vitamin (multivitamin) tablet 1 tablet, Daily    semaglutide, 0.25 or 0.5 mg/dose, (Ozempic, 0.25 or 0.5 MG/DOSE,) 2 mg/3 mL injection pen 0.25 mg under the skin every 7 days for 4 doses (28 days), THEN 0.5 mg under the skin every 7 days    SUMAtriptan (IMITREX) 50 mg, Oral, Once as needed, may repeat in 2 hours if necessary    valsartan (DIOVAN) 80 mg, Oral, Daily        Substance Abuse History:    Tobacco, Alcohol and Drug Use History     Tobacco Use    Smoking status: Former     Current packs/day: 0.00     Average packs/day: 0.1 packs/day for 20.0 years (2.0 ttl pk-yrs)     Types: Cigarettes     Start date: 2004     Quit date: 2024     Years since quittin.7     Passive exposure: Past    Smokeless tobacco: Never   Vaping Use    Vaping status: Former   Substance Use Topics    Alcohol use: Not Currently    Drug use: Never          Social History:    Social History     Socioeconomic History    Marital status:      Spouse name: Not on file    Number of children: Not on file    Years of education: Not on file    Highest education level: Not on file   Occupational History    Not on file   Other Topics Concern    Not on file   Social History Narrative        Has 3 children    Works as [employer]         Family Psychiatric History:     Family History   Problem Relation Age of Onset    Lung cancer Mother     Diabetes Mother     Clotting disorder Father     Pulmonary embolism Father     Lung cancer Paternal Grandfather        Medical History Reviewed by provider this encounter:  Tobacco  Allergies  Meds  Problems  Med Hx  Surg Hx  Fam Hx          Objective   There were no vitals taken for this visit.     Mental Status Evaluation:    Appearance age appropriate, casually dressed   Behavior cooperative, calm   Speech normal rate, normal volume, normal pitch, spontaneous   Mood euthymic   Affect normal range and intensity, appropriate   Thought Processes organized, goal directed   Thought Content no overt delusions   Perceptual Disturbances: no auditory hallucinations, no visual hallucinations   Abnormal Thoughts  Risk Potential Suicidal ideation - None  Homicidal ideation - None  Potential for aggression - No   Orientation oriented to person, place, time/date, and situation   Memory recent and remote memory grossly intact   Consciousness alert and awake   Attention Span Concentration Span attention span and concentration are age appropriate   Intellect appears to be of average intelligence   Insight intact   Judgement intact   Muscle Strength and  Gait normal muscle strength and normal muscle tone, normal gait and normal balance   Motor activity no abnormal movements   Language no difficulty naming common objects, no difficulty repeating a phrase, no difficulty writing a sentence   Fund of Knowledge adequate knowledge of current events  adequate fund of knowledge regarding past history  adequate fund of knowledge regarding vocabulary        Laboratory Results: I have personally reviewed all pertinent laboratory/tests results    Most Recent Labs:   Lab Results   Component Value Date    SODIUM 139 01/19/2025    K 4.1 01/19/2025     01/19/2025    CO2 26 01/19/2025    BUN 11 01/19/2025    CREATININE 0.74  "01/19/2025    CALCIUM 8.9 01/19/2025    AST 50 (H) 01/18/2025    ALT 37 01/18/2025    ALKPHOS 99 01/18/2025    TP 7.4 01/18/2025    TBILI 0.4 01/18/2025       Suicide/Homicide Risk Assessment:    Risk of Harm to Self:  The following ratings are based on assessment at the time of the interview  Historical Risk Factors include: chronic psychiatric problems  Protective Factors: no current suicidal ideation, ability to adapt to change, able to make plans for the future, access to mental health treatment, compliant with medications, compliant with mental health treatment    Risk of Harm to Others:  The following ratings are based on assessment at the time of the interview  Historical Risk Factors include: none  Protective Factors: no current homicidal ideation, ability to adapt to change, able to manage anger well, access to mental health treatment, compliant with medications, compliant with mental health treatment    The following interventions are recommended: Continue medication management. No other intervention changes indicated at this time.    Psychotherapy Provided:     Individual psychotherapy provided: No    Treatment Plan:    Completed and signed during the session: Not applicable - Treatment Plan not due at this session.    Goals: Progress towards Treatment Plan goals - Yes, progressing, as evidenced by subjective findings in HPI/Subjective Section and in Assessment and Plan Section    Depression Follow-up Plan Completed: Yes    Visit Time  Visit Start Time: 11:29 AM  Visit Stop Time: 11:39 AM  Total Visit Duration: 10 minutes    Portions of the record may have been created with voice recognition software. Occasional wrong word or \"sound a like\" substitutions may have occurred due to the inherent limitations of voice recognition software. Read the chart carefully and recognize, using context, where substitutions have occurred.    RYAN Meek 05/21/25         [1] No Known Allergies    "

## 2025-05-21 ENCOUNTER — OFFICE VISIT (OUTPATIENT)
Dept: FAMILY MEDICINE CLINIC | Facility: CLINIC | Age: 50
End: 2025-05-21
Payer: COMMERCIAL

## 2025-05-21 ENCOUNTER — OFFICE VISIT (OUTPATIENT)
Dept: PSYCHIATRY | Facility: CLINIC | Age: 50
End: 2025-05-21
Payer: COMMERCIAL

## 2025-05-21 VITALS
HEART RATE: 75 BPM | WEIGHT: 213.8 LBS | DIASTOLIC BLOOD PRESSURE: 72 MMHG | SYSTOLIC BLOOD PRESSURE: 134 MMHG | TEMPERATURE: 97.5 F | BODY MASS INDEX: 38.79 KG/M2 | OXYGEN SATURATION: 99 %

## 2025-05-21 DIAGNOSIS — E11.9 TYPE 2 DIABETES MELLITUS WITHOUT COMPLICATION, WITHOUT LONG-TERM CURRENT USE OF INSULIN (HCC): Primary | ICD-10-CM

## 2025-05-21 DIAGNOSIS — I10 HYPERTENSION, UNSPECIFIED TYPE: Chronic | ICD-10-CM

## 2025-05-21 DIAGNOSIS — F90.2 ATTENTION DEFICIT HYPERACTIVITY DISORDER, COMBINED TYPE: ICD-10-CM

## 2025-05-21 DIAGNOSIS — G47.33 OBSTRUCTIVE SLEEP APNEA: ICD-10-CM

## 2025-05-21 DIAGNOSIS — F43.10 POSTTRAUMATIC STRESS DISORDER: ICD-10-CM

## 2025-05-21 DIAGNOSIS — F33.9 EPISODE OF RECURRENT MAJOR DEPRESSIVE DISORDER, UNSPECIFIED DEPRESSION EPISODE SEVERITY (HCC): Primary | ICD-10-CM

## 2025-05-21 DIAGNOSIS — F43.22 ADJUSTMENT DISORDER WITH ANXIETY: ICD-10-CM

## 2025-05-21 DIAGNOSIS — Z12.11 COLON CANCER SCREENING: ICD-10-CM

## 2025-05-21 DIAGNOSIS — R07.9 CHEST PAIN, UNSPECIFIED TYPE: ICD-10-CM

## 2025-05-21 PROCEDURE — 99214 OFFICE O/P EST MOD 30 MIN: CPT

## 2025-05-21 PROCEDURE — 99214 OFFICE O/P EST MOD 30 MIN: CPT | Performed by: FAMILY MEDICINE

## 2025-05-21 RX ORDER — DEXTROAMPHETAMINE SACCHARATE, AMPHETAMINE ASPARTATE, DEXTROAMPHETAMINE SULFATE AND AMPHETAMINE SULFATE 7.5; 7.5; 7.5; 7.5 MG/1; MG/1; MG/1; MG/1
30 TABLET ORAL 2 TIMES DAILY
Qty: 60 TABLET | Refills: 0 | Status: SHIPPED | OUTPATIENT
Start: 2025-07-30

## 2025-05-21 RX ORDER — DEXTROAMPHETAMINE SACCHARATE, AMPHETAMINE ASPARTATE, DEXTROAMPHETAMINE SULFATE AND AMPHETAMINE SULFATE 7.5; 7.5; 7.5; 7.5 MG/1; MG/1; MG/1; MG/1
30 TABLET ORAL 2 TIMES DAILY
Qty: 60 TABLET | Refills: 0 | Status: SHIPPED | OUTPATIENT
Start: 2025-07-02

## 2025-05-21 RX ORDER — DEXTROAMPHETAMINE SACCHARATE, AMPHETAMINE ASPARTATE, DEXTROAMPHETAMINE SULFATE AND AMPHETAMINE SULFATE 7.5; 7.5; 7.5; 7.5 MG/1; MG/1; MG/1; MG/1
30 TABLET ORAL 2 TIMES DAILY
Qty: 60 TABLET | Refills: 0 | Status: SHIPPED | OUTPATIENT
Start: 2025-06-04

## 2025-05-21 NOTE — ASSESSMENT & PLAN NOTE
Saw sleep medicine and was scheduled for sleep study. Was canceled and she has no idea why.   Will call their office

## 2025-05-21 NOTE — ASSESSMENT & PLAN NOTE
Started on diovan 80 mg at time of last visit in march  Bp control improved  Reminded her to go for labs as previously ordered.

## 2025-05-21 NOTE — PROGRESS NOTES
Name: Shani Gallegos      : 1975      MRN: [id number]  Encounter Provider: Chelsey Ojeda DO  Encounter Date: 2025   Encounter department: [facility] PRIMARY CARE    Assessment & Plan  Type 2 diabetes mellitus without complication, without long-term current use of insulin (HCC)  A1c in office in march was 6.7  She was started on metformin and never started  She was also started on glp1 and is tolerating well thus far.   Got dexcom but not wearing.   She agrees to start the metformin and wear dexcom  Will get labs done   Lab Results   Component Value Date    HGBA1C 6.7 (A) 2025            Hypertension, unspecified type  Started on diovan 80 mg at time of last visit in march  Bp control improved  Reminded her to go for labs as previously ordered.          Chest pain, unspecified type  ? GI vs cardiac  EKG in office today NSR. No acute changes.   Send her for treadmill stress test  Orders:    ECG 12 lead; Future    Stress test only, exercise; Future    Obstructive sleep apnea  Saw sleep medicine and was scheduled for sleep study. Was canceled and she has no idea why.   Will call their office         Colon cancer screening    Orders:    Cologuard         History of Present Illness     HPI  Patient is a 49 year old female with hypertension, migraine, CHRIS, fatty liver, cirrhosis, DM2, depression, PTSD, ADHD and cigarette dependence in remission who is being seen today for f/u on her diabetes, hypertension and obesity.     Seen here in march. I did an A1c in office due to some elevated glucose readings on labs done at [facility] over the last year.   Her A1c was 6.7, consistent with dx of DM2. She declined diabetes education. Was started on metformin and monjaura. The monjauro was not covered by insurance and this was changed to ozempic.   Is not taking the metformin. Her grandmother told her bad things about it.   She got the dexcom but is not wearing it. Plans to start.   She is using  the ozempic and has some nausea for first couple of days.   She did not get labs done as ordered at time of her new patient visit and reminded her today  Weight was 234 at time of new patient visit and is down 21 lbs.     Her BP was also elevated at time of that visit and she was started on diovan 80 mg daily. Tolerating this well.     Was referred to sleep medicine for her known CHRIS. Had visit with them on 3/13/25 and was scheduled for home sleep study in may. This was canceled by their office. She has no idea why?     Had episode of chest pain last week while driving. Pain was in center of chest and lasted for 2 minutes. Pain did not radiate. Feels like someone is blowing air into chest and something stuck in chest. No associated shortness of breath, nausea. Similar episode a couple of years ago and passed out from the pain. No pain presently.       Review of Systems  Past Medical History:   Diagnosis Date    Cirrhosis (HCC)     Fatty liver     GERD (gastroesophageal reflux disease)     Hypertension     Hypoglycemia     Insufficiency fracture of medial femoral condyle (HCC) 2024    Low back pain     Migraines     Neck pain     Umbilical hernia without obstruction and without gangrene 2021    CT Abdomen and pelvis 25 done for complaint of abdominal pain showed incidental finding of   Enlarging fat-containing paraumbilical hernia with mild inflammatory   stranding of the herniated fat   Underwent surgery for incarcerated ventral hernia repair with dr pickard on 25       Past Surgical History:   Procedure Laterality Date    ABDOMINAL SURGERY  11/10/2015    LIPECTOMY WITH TRANSLOCATION UMBILICUS INCISIONAL HERNIA REPAIR WITH MESH by Dr. Behman and Dr. Bray on 11/10    BREAST BIOPSY Right     needle biopsy, stereo, benign     SECTION      LIPECTOMY  11/10/2015    dr pickard    VENTRAL HERNIA REPAIR  2025    dr pickard at [facility]     Family History   Problem Relation Age of Onset    Lung  cancer Mother     Diabetes Mother     Clotting disorder Father     Pulmonary embolism Father     Lung cancer Paternal Grandfather      Social History     Tobacco Use    Smoking status: Former     Current packs/day: 0.00     Average packs/day: 0.1 packs/day for 20.0 years (2.0 ttl pk-yrs)     Types: Cigarettes     Start date: 2004     Quit date: 2024     Years since quittin.7     Passive exposure: Past    Smokeless tobacco: Never   Vaping Use    Vaping status: Former   Substance and Sexual Activity    Alcohol use: Not Currently    Drug use: Never    Sexual activity: Not Currently     Medications[1]  No Known Allergies  Immunization History   Administered Date(s) Administered    COVID-19 PFIZER VACCINE 0.3 ML IM 2021, 2021    Hep B, adult 2013, 2013, 2013    INFLUENZA 2010, 10/22/2013, 2015, 2018    Influenza, seasonal, injectable 10/22/2013    Td (adult), adsorbed 2022    Tdap 10/22/2013    Tuberculin Skin Test-PPD Intradermal 2022     Objective   /72 (BP Location: Left arm, Patient Position: Sitting, Cuff Size: Large)   Pulse 75   Temp 97.5 °F (36.4 °C) (Tympanic)   Wt 97 kg (213 lb 12.8 oz)   SpO2 99%   BMI 38.79 kg/m²     Physical Exam           [1]   Current Outpatient Medications on File Prior to Visit   Medication Sig    amphetamine-dextroamphetamine (ADDERALL) 30 MG tablet TAKE 1 TABLET BY MOUTH TWICE A DAY MAX DAILY AMOUNT: 60MG    Continuous Glucose  (Dexcom G7 ) SHANNA     Continuous Glucose Sensor (Dexcom G7 Sensor) Use 1 Device every 10 days    glucose blood test strip Use as instructed    Lancets (freestyle) lancets Use as instructed    Multiple Vitamin (multivitamin) tablet Take 1 tablet by mouth in the morning.    semaglutide, 0.25 or 0.5 mg/dose, (Ozempic, 0.25 or 0.5 MG/DOSE,) 2 mg/3 mL injection pen 0.25 mg under the skin every 7 days for 4 doses (28 days), THEN 0.5 mg under the skin every 7 days     SUMAtriptan (IMITREX) 50 mg tablet Take 1 tablet (50 mg total) by mouth once as needed for migraine for up to 1 dose may repeat in 2 hours if necessary    valsartan (DIOVAN) 80 mg tablet Take 1 tablet (80 mg total) by mouth daily    [DISCONTINUED] metFORMIN (GLUCOPHAGE-XR) 500 mg 24 hr tablet Take 1 tablet (500 mg total) by mouth daily with dinner (Patient not taking: Reported on 5/21/2025)

## 2025-05-21 NOTE — ASSESSMENT & PLAN NOTE
A1c in office in march was 6.7  She was started on metformin and never started  She was also started on glp1 and is tolerating well thus far.   Got dexcom but not wearing.   She agrees to start the metformin and wear dexcom  Will get labs done   Lab Results   Component Value Date    HGBA1C 6.7 (A) 03/12/2025

## 2025-05-21 NOTE — ASSESSMENT & PLAN NOTE
Orders:    amphetamine-dextroamphetamine (ADDERALL) 30 MG tablet; Take 1 tablet (30 mg total) by mouth 2 (two) times a day Max Daily Amount: 60 mg Do not start before June 4, 2025.    amphetamine-dextroamphetamine (ADDERALL, 30MG,) 30 MG tablet; Take 1 tablet (30 mg total) by mouth 2 (two) times a day Max Daily Amount: 60 mg Do not start before July 2, 2025.    amphetamine-dextroamphetamine (ADDERALL, 30MG,) 30 MG tablet; Take 1 tablet (30 mg total) by mouth 2 (two) times a day Max Daily Amount: 60 mg Do not start before July 30, 2025.

## 2025-05-27 ENCOUNTER — TELEPHONE (OUTPATIENT)
Age: 50
End: 2025-05-27

## 2025-05-27 DIAGNOSIS — E11.9 TYPE 2 DIABETES MELLITUS WITHOUT COMPLICATION, WITHOUT LONG-TERM CURRENT USE OF INSULIN (HCC): ICD-10-CM

## 2025-05-27 PROCEDURE — 93010 ELECTROCARDIOGRAM REPORT: CPT | Performed by: FAMILY MEDICINE

## 2025-05-27 RX ORDER — ACYCLOVIR 400 MG/1
1 TABLET ORAL
Qty: 9 EACH | Refills: 3 | Status: SHIPPED | OUTPATIENT
Start: 2025-05-27

## 2025-05-27 NOTE — TELEPHONE ENCOUNTER
Patient has misplaced scanner for Dexcom, would like script sent for new one to [facility] as soon as possible to [facility]. Does have finger prick supplies for now

## 2025-06-02 NOTE — TELEPHONE ENCOUNTER
PA for (Dexcom G7 Sensor) SUBMITTED to [employer]    via    []CMM-KEY:   [x]Surescripts-Case ID # [id number]   []Availity-Auth ID # NDC #   []Faxed to plan   []Other website   []Phone call Case ID #     [x]PA sent as URGENT    All office notes, labs and other pertaining documents and studies sent. Clinical questions answered. Awaiting determination from insurance company.     Turnaround time for your insurance to make a decision on your Prior Authorization can take 7-21 business days.

## 2025-06-02 NOTE — TELEPHONE ENCOUNTER
Request for PA for Dexcom received via fax today.    [facility] Key: [id number]    Please initiate and update patient and pharmacy when able.    Thank you!

## 2025-06-03 DIAGNOSIS — E11.9 TYPE 2 DIABETES MELLITUS WITHOUT COMPLICATIONS (HCC): ICD-10-CM

## 2025-06-03 NOTE — TELEPHONE ENCOUNTER
PA for (Dexcom G7 Sensor) APPROVED     Date(s) approved 06/02/25-06/02/26    Case # [id number]    Patient advised by          []Liiiiket Message  [x]Phone call   [x]LMOM  []L/M to call office as no active Communication consent on file  []Unable to leave detailed message as VM not approved on Communication consent       Pharmacy advised by    [x]Fax  []Phone call  []Secure Chat       Approval letter scanned into Media No , not available at time of approval

## 2025-06-04 RX ORDER — ACYCLOVIR 400 MG/1
TABLET ORAL
Qty: 1 EACH | Refills: 0 | Status: SHIPPED | OUTPATIENT
Start: 2025-06-04

## 2025-06-06 ENCOUNTER — TELEPHONE (OUTPATIENT)
Dept: PSYCHIATRY | Facility: CLINIC | Age: 50
End: 2025-06-06

## 2025-06-06 NOTE — TELEPHONE ENCOUNTER
PA for Amphetamine-Dextroamphetamine 30MG tablets SUBMITTED to [employer]    via    [x]CMM-KEY: [id number]  []Surescripts-Case ID #   []Availity-Auth ID # NDC #   []Faxed to plan   []Other website   []Phone call Case ID #     []PA sent as URGENT    All office notes, labs and other pertaining documents and studies sent. Clinical questions answered. Awaiting determination from insurance company.     Turnaround time for your insurance to make a decision on your Prior Authorization can take 7-21 business days.

## 2025-06-10 NOTE — TELEPHONE ENCOUNTER
PA for AMPHETAMINE-DEXTROAMPHETAMINE 30MG Tablet APPROVED     Date(s) approved 06/06/2025 to 06/06/2026    Case #    Patient advised by          [x]MyChart Message  []Phone call   [x]LMOM  []L/M to call office as no active Communication consent on file  []Unable to leave detailed message as VM not approved on Communication consent       Pharmacy advised by    []Fax  [x]Phone call  []Secure Chat    Specialty Pharmacy    []     Approval letter scanned into Media Yes

## 2025-06-27 ENCOUNTER — HOSPITAL ENCOUNTER (OUTPATIENT)
Dept: NON INVASIVE DIAGNOSTICS | Age: 50
Discharge: HOME | End: 2025-06-27
Attending: FAMILY MEDICINE
Payer: COMMERCIAL

## 2025-06-27 LAB
CHEST PAIN STATEMENT: NORMAL
MAX DIASTOLIC BP: 70 MMHG
MAX HR PERCENT: 86 %
MAX HR: 148 BPM
MAX PREDICTED HEART RATE: 171 BPM
PROTOCOL NAME: NORMAL
RATE PRESSURE PRODUCT: NORMAL
REASON FOR TERMINATION: NORMAL
SL CV STRESS STAGE REACHED: 3
STRESS ANGINA INDEX: 0
STRESS BASELINE HR: 88 BPM
STRESS PEAK HR: 148 BPM
STRESS POST ESTIMATED WORKLOAD: 10.1 METS
STRESS POST EXERCISE DUR MIN: 7 MIN
STRESS POST EXERCISE DUR MIN: 7 MIN
STRESS POST EXERCISE DUR SEC: 21 SEC
STRESS POST EXERCISE DUR SEC: 21 SEC
STRESS POST PEAK BP: 172 MMHG
STRESS POST PEAK HR: 148 BPM
STRESS POST PEAK SYSTOLIC BP: 172 MMHG
TARGET HR FORMULA: NORMAL
TEST INDICATION: NORMAL

## 2025-06-27 PROCEDURE — 93017 CV STRESS TEST TRACING ONLY: CPT

## 2025-06-27 PROCEDURE — 93016 CV STRESS TEST SUPVJ ONLY: CPT | Performed by: INTERNAL MEDICINE

## 2025-06-27 PROCEDURE — 93018 CV STRESS TEST I&R ONLY: CPT | Performed by: INTERNAL MEDICINE

## 2025-06-30 DIAGNOSIS — E11.9 TYPE 2 DIABETES MELLITUS WITHOUT COMPLICATION, WITHOUT LONG-TERM CURRENT USE OF INSULIN (HCC): ICD-10-CM

## 2025-06-30 LAB
MAX HR PERCENT: 86 %
MAX HR: 148 BPM
RATE PRESSURE PRODUCT: NORMAL
SL CV STRESS STAGE REACHED: 3
STRESS ANGINA INDEX: 0
STRESS BASELINE HR: 88 BPM
STRESS PEAK HR: 148 BPM
STRESS POST ESTIMATED WORKLOAD: 10.1 METS
STRESS POST EXERCISE DUR MIN: 7 MIN
STRESS POST EXERCISE DUR SEC: 21 SEC
STRESS POST PEAK BP: 172 MMHG

## 2025-06-30 NOTE — TELEPHONE ENCOUNTER
Medication:     semaglutide, 0.25 or 0.5 mg/dose, (Ozempic, 0.25 or 0.5 MG/DOSE,) 2 mg/3 mL injection pen       Dose/Frequency: 0.5 mg    Quantity: 9 ml    Pharmacy: [facility]/pharmacy #[id number] - [place], [place] - [address]      Office:   [x] PCP/Provider -   [] Speciality/Provider -     Does the patient have enough for 3 days?   [] Yes   [x] No - Send as HP to POD

## 2025-06-30 NOTE — TELEPHONE ENCOUNTER
Patient call about inquiring about her stress test result. Please reach out to the patient about the stress test result. thank you

## 2025-07-31 DIAGNOSIS — E11.9 TYPE 2 DIABETES MELLITUS WITHOUT COMPLICATION, WITHOUT LONG-TERM CURRENT USE OF INSULIN (HCC): ICD-10-CM
